# Patient Record
Sex: FEMALE | Race: BLACK OR AFRICAN AMERICAN | NOT HISPANIC OR LATINO | Employment: FULL TIME | URBAN - METROPOLITAN AREA
[De-identification: names, ages, dates, MRNs, and addresses within clinical notes are randomized per-mention and may not be internally consistent; named-entity substitution may affect disease eponyms.]

---

## 2017-03-27 ENCOUNTER — TELEPHONE (OUTPATIENT)
Dept: INTERNAL MEDICINE | Facility: CLINIC | Age: 27
End: 2017-03-27

## 2017-03-27 NOTE — TELEPHONE ENCOUNTER
----- Message from Sushma Flores sent at 3/27/2017  4:08 PM CDT -----  [phone]    Would like to consult with the nurse about patient's medication.  Pt medication is currently on back order and would like a different Rx.  Please call back @ [phone].  Thanks-[facility]

## 2017-06-03 DIAGNOSIS — E55.9 VITAMIN D DEFICIENCY: ICD-10-CM

## 2017-06-04 RX ORDER — ERGOCALCIFEROL 1.25 MG/1
50000 CAPSULE ORAL WEEKLY
Qty: 4 CAPSULE | Refills: 3 | OUTPATIENT
Start: 2017-06-04 | End: 2017-07-04

## 2017-11-24 ENCOUNTER — TELEPHONE (OUTPATIENT)
Dept: INTERNAL MEDICINE | Facility: CLINIC | Age: 27
End: 2017-11-24

## 2017-11-24 ENCOUNTER — LAB VISIT (OUTPATIENT)
Dept: LAB | Facility: HOSPITAL | Age: 27
End: 2017-11-24
Attending: INTERNAL MEDICINE
Payer: COMMERCIAL

## 2017-11-24 DIAGNOSIS — E55.9 VITAMIN D DEFICIENCY: ICD-10-CM

## 2017-11-24 DIAGNOSIS — R06.02 SOB (SHORTNESS OF BREATH): Primary | ICD-10-CM

## 2017-11-24 DIAGNOSIS — R06.02 SOB (SHORTNESS OF BREATH): ICD-10-CM

## 2017-11-24 LAB
25(OH)D3+25(OH)D2 SERPL-MCNC: 10 NG/ML
ALBUMIN SERPL BCP-MCNC: 3.6 G/DL
ALP SERPL-CCNC: 61 U/L
ALT SERPL W/O P-5'-P-CCNC: 9 U/L
ANION GAP SERPL CALC-SCNC: 3 MMOL/L
AST SERPL-CCNC: 15 U/L
BASOPHILS # BLD AUTO: 0.03 K/UL
BASOPHILS NFR BLD: 0.4 %
BILIRUB SERPL-MCNC: 0.3 MG/DL
BUN SERPL-MCNC: 11 MG/DL
CALCIUM SERPL-MCNC: 9 MG/DL
CHLORIDE SERPL-SCNC: 108 MMOL/L
CO2 SERPL-SCNC: 28 MMOL/L
CREAT SERPL-MCNC: 0.8 MG/DL
DIFFERENTIAL METHOD: ABNORMAL
EOSINOPHIL # BLD AUTO: 0.1 K/UL
EOSINOPHIL NFR BLD: 1.1 %
ERYTHROCYTE [DISTWIDTH] IN BLOOD BY AUTOMATED COUNT: 16.4 %
EST. GFR  (AFRICAN AMERICAN): >60 ML/MIN/1.73 M^2
EST. GFR  (NON AFRICAN AMERICAN): >60 ML/MIN/1.73 M^2
FERRITIN SERPL-MCNC: 7 NG/ML
GLUCOSE SERPL-MCNC: 77 MG/DL
HCT VFR BLD AUTO: 31.7 %
HGB BLD-MCNC: 9.8 G/DL
IMM GRANULOCYTES # BLD AUTO: 0.02 K/UL
IMM GRANULOCYTES NFR BLD AUTO: 0.2 %
IRON SERPL-MCNC: 20 UG/DL
LYMPHOCYTES # BLD AUTO: 1.9 K/UL
LYMPHOCYTES NFR BLD: 22.4 %
MCH RBC QN AUTO: 24.3 PG
MCHC RBC AUTO-ENTMCNC: 30.9 G/DL
MCV RBC AUTO: 79 FL
MONOCYTES # BLD AUTO: 0.6 K/UL
MONOCYTES NFR BLD: 7.4 %
NEUTROPHILS # BLD AUTO: 5.7 K/UL
NEUTROPHILS NFR BLD: 68.5 %
NRBC BLD-RTO: 0 /100 WBC
PLATELET # BLD AUTO: 444 K/UL
PMV BLD AUTO: 11.3 FL
POTASSIUM SERPL-SCNC: 3.6 MMOL/L
PROT SERPL-MCNC: 7.3 G/DL
RBC # BLD AUTO: 4.04 M/UL
SATURATED IRON: 5 %
SODIUM SERPL-SCNC: 139 MMOL/L
TOTAL IRON BINDING CAPACITY: 410 UG/DL
TRANSFERRIN SERPL-MCNC: 277 MG/DL
TSH SERPL DL<=0.005 MIU/L-ACNC: 0.89 UIU/ML
WBC # BLD AUTO: 8.25 K/UL

## 2017-11-24 PROCEDURE — 84443 ASSAY THYROID STIM HORMONE: CPT

## 2017-11-24 PROCEDURE — 85025 COMPLETE CBC W/AUTO DIFF WBC: CPT

## 2017-11-24 PROCEDURE — 82728 ASSAY OF FERRITIN: CPT

## 2017-11-24 PROCEDURE — 83540 ASSAY OF IRON: CPT

## 2017-11-24 PROCEDURE — 80053 COMPREHEN METABOLIC PANEL: CPT

## 2017-11-24 PROCEDURE — 82306 VITAMIN D 25 HYDROXY: CPT

## 2017-11-24 PROCEDURE — 36415 COLL VENOUS BLD VENIPUNCTURE: CPT | Mod: PO

## 2017-11-24 NOTE — TELEPHONE ENCOUNTER
Please sched lab now.  Pt reporting sob on exertion.  Not taking iron, continues with menorrhagia.  Not taking vit D.  SM

## 2017-11-26 ENCOUNTER — PATIENT MESSAGE (OUTPATIENT)
Dept: INTERNAL MEDICINE | Facility: CLINIC | Age: 27
End: 2017-11-26

## 2017-11-26 DIAGNOSIS — D50.0 IRON DEFICIENCY ANEMIA DUE TO CHRONIC BLOOD LOSS: Primary | ICD-10-CM

## 2017-11-26 RX ORDER — ERGOCALCIFEROL 1.25 MG/1
50000 CAPSULE ORAL WEEKLY
Qty: 4 CAPSULE | Refills: 4 | Status: SHIPPED | OUTPATIENT
Start: 2017-11-26 | End: 2017-12-26

## 2018-02-21 ENCOUNTER — LAB VISIT (OUTPATIENT)
Dept: LAB | Facility: HOSPITAL | Age: 28
End: 2018-02-21
Attending: INTERNAL MEDICINE
Payer: COMMERCIAL

## 2018-02-21 DIAGNOSIS — D50.0 IRON DEFICIENCY ANEMIA DUE TO CHRONIC BLOOD LOSS: ICD-10-CM

## 2018-02-21 LAB
25(OH)D3+25(OH)D2 SERPL-MCNC: 11 NG/ML
BASOPHILS # BLD AUTO: 0.02 K/UL
BASOPHILS NFR BLD: 0.3 %
DIFFERENTIAL METHOD: ABNORMAL
EOSINOPHIL # BLD AUTO: 0.1 K/UL
EOSINOPHIL NFR BLD: 0.8 %
ERYTHROCYTE [DISTWIDTH] IN BLOOD BY AUTOMATED COUNT: 17.2 %
FERRITIN SERPL-MCNC: 11 NG/ML
HCT VFR BLD AUTO: 34.3 %
HGB BLD-MCNC: 11 G/DL
IMM GRANULOCYTES # BLD AUTO: 0.02 K/UL
IMM GRANULOCYTES NFR BLD AUTO: 0.3 %
IRON SERPL-MCNC: 227 UG/DL
LYMPHOCYTES # BLD AUTO: 1.4 K/UL
LYMPHOCYTES NFR BLD: 21.8 %
MCH RBC QN AUTO: 26.6 PG
MCHC RBC AUTO-ENTMCNC: 32.1 G/DL
MCV RBC AUTO: 83 FL
MONOCYTES # BLD AUTO: 0.5 K/UL
MONOCYTES NFR BLD: 7.2 %
NEUTROPHILS # BLD AUTO: 4.6 K/UL
NEUTROPHILS NFR BLD: 69.6 %
NRBC BLD-RTO: 0 /100 WBC
PLATELET # BLD AUTO: 342 K/UL
PMV BLD AUTO: 11.1 FL
RBC # BLD AUTO: 4.14 M/UL
SATURATED IRON: 71 %
TOTAL IRON BINDING CAPACITY: 321 UG/DL
TRANSFERRIN SERPL-MCNC: 217 MG/DL
WBC # BLD AUTO: 6.56 K/UL

## 2018-02-21 PROCEDURE — 36415 COLL VENOUS BLD VENIPUNCTURE: CPT | Mod: PO

## 2018-02-21 PROCEDURE — 85025 COMPLETE CBC W/AUTO DIFF WBC: CPT

## 2018-02-21 PROCEDURE — 82306 VITAMIN D 25 HYDROXY: CPT

## 2018-02-21 PROCEDURE — 82728 ASSAY OF FERRITIN: CPT

## 2018-02-21 PROCEDURE — 83540 ASSAY OF IRON: CPT

## 2018-05-10 ENCOUNTER — PATIENT MESSAGE (OUTPATIENT)
Dept: INTERNAL MEDICINE | Facility: CLINIC | Age: 28
End: 2018-05-10

## 2018-06-04 ENCOUNTER — PATIENT MESSAGE (OUTPATIENT)
Dept: INTERNAL MEDICINE | Facility: CLINIC | Age: 28
End: 2018-06-04

## 2018-08-23 ENCOUNTER — OFFICE VISIT (OUTPATIENT)
Dept: DERMATOLOGY | Facility: CLINIC | Age: 28
End: 2018-08-23
Payer: COMMERCIAL

## 2018-08-23 ENCOUNTER — PATIENT MESSAGE (OUTPATIENT)
Dept: DERMATOLOGY | Facility: CLINIC | Age: 28
End: 2018-08-23

## 2018-08-23 DIAGNOSIS — L81.9 HYPERPIGMENTATION: ICD-10-CM

## 2018-08-23 DIAGNOSIS — L70.0 ACNE VULGARIS: Primary | ICD-10-CM

## 2018-08-23 DIAGNOSIS — L21.9 SEBORRHEIC DERMATITIS: ICD-10-CM

## 2018-08-23 PROCEDURE — 99202 OFFICE O/P NEW SF 15 MIN: CPT | Mod: S$GLB,,, | Performed by: PHYSICIAN ASSISTANT

## 2018-08-23 PROCEDURE — 99999 PR PBB SHADOW E&M-EST. PATIENT-LVL III: CPT | Mod: PBBFAC,,, | Performed by: PHYSICIAN ASSISTANT

## 2018-08-23 RX ORDER — ADAPALENE AND BENZOYL PEROXIDE 3; 25 MG/G; MG/G
GEL TOPICAL
Qty: 45 G | Refills: 3 | Status: SHIPPED | OUTPATIENT
Start: 2018-08-23 | End: 2020-02-24

## 2018-08-23 RX ORDER — CLINDAMYCIN PHOSPHATE 10 MG/ML
SOLUTION TOPICAL
Qty: 60 EACH | Refills: 11 | Status: SHIPPED | OUTPATIENT
Start: 2018-08-23 | End: 2020-02-24

## 2018-08-23 RX ORDER — KETOCONAZOLE 20 MG/G
CREAM TOPICAL
Qty: 30 G | Refills: 3 | Status: SHIPPED | OUTPATIENT
Start: 2018-08-23 | End: 2019-12-11 | Stop reason: SDUPTHER

## 2018-08-23 NOTE — PROGRESS NOTES
Subjective:       Patient ID:  Beth Hernandez is a 28 y.o. female who presents for   Chief Complaint   Patient presents with    Acne     c/o acne to face and hairline     History of Present Illness: The patient presents with chief complaint of acne. + Flares every 2-3 days. +Worse with menses; +Regular menses; +Pimples, pustules, and cysts. + Oily skin; Denies sensitive skin.   Location: face and hairline  Duration: several months  Signs/Symptoms: itchy, tender, burns, hyperpigmentation    Prior treatments: Neutrogena scrub and Neutrogena Acne (sal acid) moisturizer          Review of Systems   Constitutional: Negative for fever and chills.   Gastrointestinal: Negative for nausea and vomiting.   Skin: Positive for activity-related sunscreen use. Negative for itching, rash, daily sunscreen use and recent sunburn.   Hematologic/Lymphatic: Does not bruise/bleed easily.        Objective:    Physical Exam   Constitutional: She appears well-developed and well-nourished. No distress.   Neurological: She is alert and oriented to person, place, and time. She is not disoriented.   Psychiatric: She has a normal mood and affect.   Skin:   Areas Examined (abnormalities noted in diagram):   Scalp / Hair Palpated and Inspected  Head / Face Inspection Performed  Neck Inspection Performed  Chest / Axilla Inspection Performed  Back Inspection Performed  RUE Inspected  LUE Inspection Performed                   Diagram Legend     Erythematous scaling macule/papule c/w actinic keratosis       Vascular papule c/w angioma      Pigmented verrucoid papule/plaque c/w seborrheic keratosis      Yellow umbilicated papule c/w sebaceous hyperplasia      Irregularly shaped tan macule c/w lentigo     1-2 mm smooth white papules consistent with Milia      Movable subcutaneous cyst with punctum c/w epidermal inclusion cyst      Subcutaneous movable cyst c/w pilar cyst      Firm pink to brown papule c/w dermatofibroma      Pedunculated  fleshy papule(s) c/w skin tag(s)      Evenly pigmented macule c/w junctional nevus     Mildly variegated pigmented, slightly irregular-bordered macule c/w mildly atypical nevus      Flesh colored to evenly pigmented papule c/w intradermal nevus       Pink pearly papule/plaque c/w basal cell carcinoma      Erythematous hyperkeratotic cursted plaque c/w SCC      Surgical scar with no sign of skin cancer recurrence      Open and closed comedones      Inflammatory papules and pustules      Verrucoid papule consistent consistent with wart     Erythematous eczematous patches and plaques     Dystrophic onycholytic nail with subungual debris c/w onychomycosis     Umbilicated papule    Erythematous-base heme-crusted tan verrucoid plaque consistent with inflamed seborrheic keratosis     Erythematous Silvery Scaling Plaque c/w Psoriasis     See annotation      Assessment / Plan:        Acne vulgaris  Hyperpigmentation  -     EPIDUO FORTE 0.3-2.5 % GlwP; Apply a pea-sized amount to the entire face at bedtime.  Use every third night and increase as tolerated to nightly.  Dispense: 45 g; Refill: 3  -     clindamycin (CLEOCIN T) 1 % Swab; AAA of face 1-2 times daily for acne  Dispense: 60 each; Refill: 11  Start above meds with Cetaphil Oil Control Cleanser and daily sunscreen such as Cera Ve AM with invisible zinc. D/C Neutrogena acne scrub and moisturizer.    Seborrheic dermatitis  -     ketoconazole (NIZORAL) 2 % cream; AAA 2-3 times daily prn flaking.  Dispense: 30 g; Refill: 3  Start above med. Discussed waxing and waning of condition.         Follow-up in about 3 months (around 11/23/2018) for acne.

## 2018-08-23 NOTE — PATIENT INSTRUCTIONS
"Recommend a gentle skin care regimen.  Look for cosmetics and skin care products with the label, "non-comedogenic," which means it will not cause acne.     Use a mild gentle cleanser once to twice daily such as Cetaphil Oil-Control Foaming Cleanser, CeraVe Foaming Cleanser, or Basis soap.      Use a mild gentle moisturizer such as Cera Ve or Cetaphil.    A daily sunscreen with zinc oxide, broad-spectrum coverage, and a minimum SPF of 30 is recommended to help minimize the risk of scarring and discoloration from acne lesions.  Cera Ve AM with invisible zinc is a good sunscreen for acne prone skin.    It will take about 6-8 weeks to see about a 60-80% improvement in your acne.  It is very important to be consistent with your skin care regimen and to follow the treatment plan as discussed today.     "

## 2018-08-23 NOTE — PROGRESS NOTES
Subjective:       Patient ID:  Beth Hernandez is a 28 y.o. female who presents for No chief complaint on file.    HPI    Review of Systems     Objective:    Physical Exam       Diagram Legend     Erythematous scaling macule/papule c/w actinic keratosis       Vascular papule c/w angioma      Pigmented verrucoid papule/plaque c/w seborrheic keratosis      Yellow umbilicated papule c/w sebaceous hyperplasia      Irregularly shaped tan macule c/w lentigo     1-2 mm smooth white papules consistent with Milia      Movable subcutaneous cyst with punctum c/w epidermal inclusion cyst      Subcutaneous movable cyst c/w pilar cyst      Firm pink to brown papule c/w dermatofibroma      Pedunculated fleshy papule(s) c/w skin tag(s)      Evenly pigmented macule c/w junctional nevus     Mildly variegated pigmented, slightly irregular-bordered macule c/w mildly atypical nevus      Flesh colored to evenly pigmented papule c/w intradermal nevus       Pink pearly papule/plaque c/w basal cell carcinoma      Erythematous hyperkeratotic cursted plaque c/w SCC      Surgical scar with no sign of skin cancer recurrence      Open and closed comedones      Inflammatory papules and pustules      Verrucoid papule consistent consistent with wart     Erythematous eczematous patches and plaques     Dystrophic onycholytic nail with subungual debris c/w onychomycosis     Umbilicated papule    Erythematous-base heme-crusted tan verrucoid plaque consistent with inflamed seborrheic keratosis     Erythematous Silvery Scaling Plaque c/w Psoriasis     See annotation      Assessment / Plan:        There are no diagnoses linked to this encounter.         No Follow-up on file.

## 2019-01-30 ENCOUNTER — OFFICE VISIT (OUTPATIENT)
Dept: GASTROENTEROLOGY | Facility: CLINIC | Age: 29
End: 2019-01-30
Payer: COMMERCIAL

## 2019-01-30 VITALS
WEIGHT: 180.13 LBS | HEIGHT: 66 IN | DIASTOLIC BLOOD PRESSURE: 66 MMHG | BODY MASS INDEX: 28.95 KG/M2 | SYSTOLIC BLOOD PRESSURE: 108 MMHG | HEART RATE: 68 BPM

## 2019-01-30 DIAGNOSIS — K21.9 GASTROESOPHAGEAL REFLUX DISEASE, ESOPHAGITIS PRESENCE NOT SPECIFIED: Primary | ICD-10-CM

## 2019-01-30 PROCEDURE — 3008F PR BODY MASS INDEX (BMI) DOCUMENTED: ICD-10-PCS | Mod: CPTII,S$GLB,, | Performed by: PHYSICIAN ASSISTANT

## 2019-01-30 PROCEDURE — 99999 PR PBB SHADOW E&M-EST. PATIENT-LVL IV: ICD-10-PCS | Mod: PBBFAC,,, | Performed by: PHYSICIAN ASSISTANT

## 2019-01-30 PROCEDURE — 3008F BODY MASS INDEX DOCD: CPT | Mod: CPTII,S$GLB,, | Performed by: PHYSICIAN ASSISTANT

## 2019-01-30 PROCEDURE — 99203 PR OFFICE/OUTPT VISIT, NEW, LEVL III, 30-44 MIN: ICD-10-PCS | Mod: S$GLB,,, | Performed by: PHYSICIAN ASSISTANT

## 2019-01-30 PROCEDURE — 99999 PR PBB SHADOW E&M-EST. PATIENT-LVL IV: CPT | Mod: PBBFAC,,, | Performed by: PHYSICIAN ASSISTANT

## 2019-01-30 PROCEDURE — 99203 OFFICE O/P NEW LOW 30 MIN: CPT | Mod: S$GLB,,, | Performed by: PHYSICIAN ASSISTANT

## 2019-01-30 RX ORDER — PANTOPRAZOLE SODIUM 40 MG/1
40 TABLET, DELAYED RELEASE ORAL DAILY
Qty: 30 TABLET | Refills: 2 | Status: SHIPPED | OUTPATIENT
Start: 2019-01-30 | End: 2020-02-24 | Stop reason: ALTCHOICE

## 2019-01-30 RX ORDER — DOCUSATE SODIUM 100 MG/1
100 CAPSULE, LIQUID FILLED ORAL DAILY
COMMUNITY
End: 2022-10-28

## 2019-01-30 NOTE — PROGRESS NOTES
GI OUTPATIENT NOTE    PCP: Carol Calvo No address on file    Chief Complaint   Patient presents with    new Patient    Anemia    Gastroesophageal Reflux    Heartburn    Constipation       HISTORY OF PRESENT ILLNESS:  28 y.o. female presents to the GI clinic today for initial evaluation. The patient complains of heartburn. This has been going on several months. It occurs at least five times a week and occasionally wakes her at night. She has tried OTC Prilosec which helps, but says she can't miss a day without sx. She has occasional nausea after eating but no vomiting. She denies a change in diet, weight loss, hematochezia, melena, hematemesis or dysphagia. She reports gaining 10 pounds in the last few months. She denies regular NSAID use. She has a long history of iron def anemia. She is on an iron supplement which causes some constipation and dark stool. She had an EGD and Colonoscopy in 2005 for this.    Coffee 1-2 a day  Soda - not much   Eats about 8-9 pm and in bed around 10 pm.     Past Medical History:   Diagnosis Date    Allergy     GERD (gastroesophageal reflux disease)     Iron deficiency anemia        Past Surgical History:   Procedure Laterality Date    COLONOSCOPY N/A 9/17/2015    [facility]    ESOPHAGOGASTRODUODENOSCOPY (EGD) N/A 9/17/2015    [facility]    WISDOM TOOTH EXTRACTION         Social History     Socioeconomic History    Marital status: Single     Spouse name: Not on file    Number of children: Not on file    Years of education: Not on file    Highest education level: Not on file   Social Needs    Financial resource strain: Not on file    Food insecurity - worry: Not on file    Food insecurity - inability: Not on file    Transportation needs - medical: Not on file    Transportation needs - non-medical: Not on file   Occupational History    Not on file   Tobacco Use    Smoking status: Never  Smoker    Smokeless tobacco: Never Used   Substance and Sexual Activity    Alcohol use: Yes     Alcohol/week: 0.0 oz     Comment: Socially    Drug use: No    Sexual activity: Not on file   Other Topics Concern    Are you pregnant or think you may be? Not Asked    Breast-feeding Not Asked   Social History Narrative    Not on file       Family History   Problem Relation Age of Onset    Cancer Neg Hx        MEDS/ALLERGY:  The patient's medications and allergies were reviewed and updated in the EPIC chart.     Review of Systems   Constitutional: Negative for fever.   HENT: Negative for hearing loss.    Eyes: Negative for visual disturbance.   Respiratory: Positive for shortness of breath (None currently ). Negative for cough.    Cardiovascular: Negative for chest pain.   Gastrointestinal:        As per HPI.   Genitourinary: Negative for dysuria, frequency and hematuria.   Musculoskeletal: Positive for back pain. Negative for arthralgias.   Skin: Negative for rash.   Neurological: Negative for seizures, syncope, numbness and headaches.   Hematological: Does not bruise/bleed easily.   Psychiatric/Behavioral: The patient is not nervous/anxious.        Physical Exam   Constitutional: She is oriented to person, place, and time. Vital signs are normal. She appears well-developed and well-nourished.   HENT:   Head: Normocephalic and atraumatic.   Eyes: EOM are normal.   Neck: Normal range of motion. Neck supple. Carotid bruit is not present. No thyromegaly present.   Cardiovascular: Normal rate and regular rhythm.   No murmur heard.  Pulmonary/Chest: Effort normal and breath sounds normal. No respiratory distress. She has no wheezes.   Abdominal: Soft. Normal appearance and bowel sounds are normal. She exhibits no distension and no mass. There is no tenderness.   Musculoskeletal: She exhibits no edema.   Neurological: She is alert and oriented to person, place, and time. No cranial nerve deficit. Gait normal.   Skin:  Skin is warm and dry. No rash noted.   Psychiatric: Thought content normal.       LABS/IMAGING:   Pertinent results were reviewed.     ASSESSMENT:  1. Gastroesophageal reflux disease, esophagitis presence not specified        PLAN:  I will start her on PPI daily. We discussed reflux precautions and additional information is included in her AVS. She is worried about damage so I will start with an UGI series. She will let me know through MyChart how she is doing.     Medications Ordered This Encounter   Medications    pantoprazole (PROTONIX) 40 MG tablet     Sig: Take 1 tablet (40 mg total) by mouth once daily.     Dispense:  30 tablet     Refill:  2     ORDER SUMMARY:  Orders Placed This Encounter   Procedures    FL Upper GI With KUB        Follow-up if symptoms worsen or fail to improve.     Thank you for the opportunity to participate in the care of this patient. This consult was designated to me by my supervising physician. He fully participated in the development of the assessment and recommendations.    Christopher Enamorado PA-C.

## 2019-01-30 NOTE — PATIENT INSTRUCTIONS
Tips to Control Acid Reflux    To control acid reflux, youll need to make some basic diet and lifestyle changes. The simple steps outlined below may be all youll need to ease discomfort.  Watch what you eat  · Avoid fatty foods and spicy foods.  · Eat fewer acidic foods, such as citrus and tomato-based foods. These can increase symptoms.  · Limit drinking alcohol, caffeine, and fizzy beverages. All increase acid reflux.  · Try limiting chocolate, peppermint, and spearmint. These can worsen acid reflux in some people.  Watch when you eat  · Avoid lying down for 3 hours after eating.  · Do not snack before going to bed.  Raise your head  Raising your head and upper body by 4 to 6 inches helps limit reflux when youre lying down. Put blocks under the head of your bed frame to raise it.  Other changes  · Lose weight, if you need to  · Dont exercise near bedtime  · Avoid tight-fitting clothes  · Limit aspirin and ibuprofen  · Stop smoking   Date Last Reviewed: 7/1/2016  © 4833-8650 [employer]. [address] [place] [address], [place] [address]. All rights reserved. This information is not intended as a substitute for professional medical care. Always follow your healthcare professional's instructions.

## 2019-12-11 RX ORDER — KETOCONAZOLE 20 MG/G
CREAM TOPICAL
Qty: 30 G | Refills: 3 | Status: SHIPPED | OUTPATIENT
Start: 2019-12-11 | End: 2020-02-24

## 2020-02-12 ENCOUNTER — HOSPITAL ENCOUNTER (EMERGENCY)
Facility: HOSPITAL | Age: 30
Discharge: HOME | End: 2020-02-13
Attending: FAMILY MEDICINE
Payer: COMMERCIAL

## 2020-02-12 VITALS
HEART RATE: 93 BPM | DIASTOLIC BLOOD PRESSURE: 72 MMHG | RESPIRATION RATE: 18 BRPM | BODY MASS INDEX: 27.27 KG/M2 | WEIGHT: 173.75 LBS | HEIGHT: 67 IN | OXYGEN SATURATION: 99 % | TEMPERATURE: 99 F | SYSTOLIC BLOOD PRESSURE: 125 MMHG

## 2020-02-12 DIAGNOSIS — R06.02 SHORTNESS OF BREATH: ICD-10-CM

## 2020-02-12 DIAGNOSIS — L50.9 URTICARIA: Primary | ICD-10-CM

## 2020-02-12 LAB
ALBUMIN SERPL BCP-MCNC: 3.7 G/DL (ref 3.5–5.2)
ALP SERPL-CCNC: 67 U/L (ref 55–135)
ALT SERPL W/O P-5'-P-CCNC: 8 U/L (ref 10–44)
ANION GAP SERPL CALC-SCNC: 10 MMOL/L (ref 8–16)
AST SERPL-CCNC: 12 U/L (ref 10–40)
B-HCG UR QL: NEGATIVE
BASOPHILS # BLD AUTO: 0.02 K/UL (ref 0–0.2)
BASOPHILS NFR BLD: 0.2 % (ref 0–1.9)
BILIRUB SERPL-MCNC: 0.1 MG/DL (ref 0.1–1)
BILIRUB UR QL STRIP: NEGATIVE
BNP SERPL-MCNC: 16 PG/ML (ref 0–99)
BUN SERPL-MCNC: 16 MG/DL (ref 6–20)
CALCIUM SERPL-MCNC: 8.7 MG/DL (ref 8.7–10.5)
CHLORIDE SERPL-SCNC: 108 MMOL/L (ref 95–110)
CLARITY UR: CLEAR
CO2 SERPL-SCNC: 22 MMOL/L (ref 23–29)
COLOR UR: YELLOW
CREAT SERPL-MCNC: 0.8 MG/DL (ref 0.5–1.4)
DIFFERENTIAL METHOD: ABNORMAL
EOSINOPHIL # BLD AUTO: 0.2 K/UL (ref 0–0.5)
EOSINOPHIL NFR BLD: 1.2 % (ref 0–8)
ERYTHROCYTE [DISTWIDTH] IN BLOOD BY AUTOMATED COUNT: 17.4 % (ref 11.5–14.5)
EST. GFR  (AFRICAN AMERICAN): >60 ML/MIN/1.73 M^2
EST. GFR  (NON AFRICAN AMERICAN): >60 ML/MIN/1.73 M^2
GLUCOSE SERPL-MCNC: 110 MG/DL (ref 70–110)
GLUCOSE UR QL STRIP: NEGATIVE
HCT VFR BLD AUTO: 30.9 % (ref 37–48.5)
HGB BLD-MCNC: 9.1 G/DL (ref 12–16)
HGB UR QL STRIP: NEGATIVE
IMM GRANULOCYTES # BLD AUTO: 0.07 K/UL (ref 0–0.04)
IMM GRANULOCYTES NFR BLD AUTO: 0.5 % (ref 0–0.5)
KETONES UR QL STRIP: NEGATIVE
LEUKOCYTE ESTERASE UR QL STRIP: NEGATIVE
LYMPHOCYTES # BLD AUTO: 2.9 K/UL (ref 1–4.8)
LYMPHOCYTES NFR BLD: 22.6 % (ref 18–48)
MCH RBC QN AUTO: 21.9 PG (ref 27–31)
MCHC RBC AUTO-ENTMCNC: 29.4 G/DL (ref 32–36)
MCV RBC AUTO: 75 FL (ref 82–98)
MONOCYTES # BLD AUTO: 0.9 K/UL (ref 0.3–1)
MONOCYTES NFR BLD: 6.8 % (ref 4–15)
NEUTROPHILS # BLD AUTO: 8.9 K/UL (ref 1.8–7.7)
NEUTROPHILS NFR BLD: 68.7 % (ref 38–73)
NITRITE UR QL STRIP: NEGATIVE
NRBC BLD-RTO: 0 /100 WBC
PH UR STRIP: 6 [PH] (ref 5–8)
PLATELET # BLD AUTO: 431 K/UL (ref 150–350)
PMV BLD AUTO: 10.3 FL (ref 9.2–12.9)
POTASSIUM SERPL-SCNC: 3.9 MMOL/L (ref 3.5–5.1)
PROT SERPL-MCNC: 6.9 G/DL (ref 6–8.4)
PROT UR QL STRIP: NEGATIVE
RBC # BLD AUTO: 4.15 M/UL (ref 4–5.4)
SODIUM SERPL-SCNC: 140 MMOL/L (ref 136–145)
SP GR UR STRIP: >=1.03 (ref 1–1.03)
TROPONIN I SERPL DL<=0.01 NG/ML-MCNC: <0.006 NG/ML (ref 0–0.03)
URN SPEC COLLECT METH UR: ABNORMAL
UROBILINOGEN UR STRIP-ACNC: 1 EU/DL
WBC # BLD AUTO: 12.9 K/UL (ref 3.9–12.7)

## 2020-02-12 PROCEDURE — 81003 URINALYSIS AUTO W/O SCOPE: CPT

## 2020-02-12 PROCEDURE — 81025 URINE PREGNANCY TEST: CPT

## 2020-02-12 PROCEDURE — 80053 COMPREHEN METABOLIC PANEL: CPT

## 2020-02-12 PROCEDURE — 85025 COMPLETE CBC W/AUTO DIFF WBC: CPT

## 2020-02-12 PROCEDURE — 25000003 PHARM REV CODE 250: Performed by: FAMILY MEDICINE

## 2020-02-12 PROCEDURE — 96374 THER/PROPH/DIAG INJ IV PUSH: CPT

## 2020-02-12 PROCEDURE — 93005 ELECTROCARDIOGRAM TRACING: CPT

## 2020-02-12 PROCEDURE — 99285 EMERGENCY DEPT VISIT HI MDM: CPT | Mod: 25

## 2020-02-12 PROCEDURE — 93010 ELECTROCARDIOGRAM REPORT: CPT | Mod: ,,, | Performed by: INTERNAL MEDICINE

## 2020-02-12 PROCEDURE — 36415 COLL VENOUS BLD VENIPUNCTURE: CPT

## 2020-02-12 PROCEDURE — 63600175 PHARM REV CODE 636 W HCPCS: Performed by: FAMILY MEDICINE

## 2020-02-12 PROCEDURE — 83880 ASSAY OF NATRIURETIC PEPTIDE: CPT

## 2020-02-12 PROCEDURE — 93010 EKG 12-LEAD: ICD-10-PCS | Mod: ,,, | Performed by: INTERNAL MEDICINE

## 2020-02-12 PROCEDURE — 84484 ASSAY OF TROPONIN QUANT: CPT

## 2020-02-12 RX ORDER — METHYLPREDNISOLONE 4 MG/1
TABLET ORAL
Qty: 1 PACKAGE | Refills: 0 | Status: SHIPPED | OUTPATIENT
Start: 2020-02-12 | End: 2020-02-24 | Stop reason: ALTCHOICE

## 2020-02-12 RX ORDER — METHYLPREDNISOLONE SOD SUCC 125 MG
125 VIAL (EA) INJECTION
Status: COMPLETED | OUTPATIENT
Start: 2020-02-12 | End: 2020-02-12

## 2020-02-12 RX ORDER — DIPHENHYDRAMINE HCL 50 MG
50 CAPSULE ORAL
Status: COMPLETED | OUTPATIENT
Start: 2020-02-12 | End: 2020-02-12

## 2020-02-12 RX ADMIN — METHYLPREDNISOLONE SODIUM SUCCINATE 125 MG: 125 INJECTION, POWDER, FOR SOLUTION INTRAMUSCULAR; INTRAVENOUS at 11:02

## 2020-02-12 RX ADMIN — DIPHENHYDRAMINE HYDROCHLORIDE 50 MG: 50 CAPSULE ORAL at 11:02

## 2020-02-13 NOTE — ED PROVIDER NOTES
2/12/2020, 8:59 PM   History obtained from the patient      History of Present Illness: Beth Hernandez is a 30 y.o. female patient presenting to the er for evaluation of hives, lip swelling, throat tightness and shortness of breath. Pt reports she is currently on amoxicillin, steroids, and an antihistime.    9:01 PM: Pt was placed back in [facility]. I explained to pt that due to lack of available rooms pt was seen by myself to begin the workup. Pt understands they will be seen and dispositioned by the next available physician. Pt voices understanding and agrees with plan. Pt also understands the longer than normal wait time. I am removing myself from the care of pt and pt will now be assigned to the next available physician.         SCRIBE #1 NOTE: I, Dwayne Linton, am scribing for, and in the presence of, Sri Vivas MD. I have scribed the entire note.       History     Chief Complaint   Patient presents with    Urticaria     Breaking out last few weeks, lips swelling and feeling throat closing.     Review of patient's allergies indicates:  No Known Allergies      History of Present Illness     HPI    2/12/2020, 10:04 PM  History obtained from the patient      History of Present Illness: Beth Hernandez is a 30 y.o. female patient with a PMHx of GERD and iron deficiency anemia who presents to the Emergency Department for evaluation of urticaria which onset gradually 3 weeks PTA. Symptoms are constant and moderate in severity. Pt had been Rx'd amoxacillin for an ear infection 8 days ago. No mitigating or exacerbating factors reported. Associated sxs include SOB, trouble swallowing, and CP with deep breaths. Patient denies any drooling, N/V, speech difficulty, voice change, wheezing, and all other sxs at this time. Prior Tx includes benadryl with minor sx improvement. No further complaints or concerns at this time.       Arrival mode: Personal vehicle    PCP: Carol Calvo MD        Past  Medical History:  Past Medical History:   Diagnosis Date    Allergy     GERD (gastroesophageal reflux disease)     Iron deficiency anemia        Past Surgical History:  Past Surgical History:   Procedure Laterality Date    COLONOSCOPY N/A 9/17/2015    Procedure: COLONOSCOPY;  Surgeon: Fred Baez MD;  Location: [facility];  Service: Endoscopy;  Laterality: N/A;    ESOPHAGOGASTRODUODENOSCOPY  2015    WISDOM TOOTH EXTRACTION           Family History:  Family History   Problem Relation Age of Onset    Cancer Neg Hx        Social History:  Social History     Tobacco Use    Smoking status: Never Smoker    Smokeless tobacco: Never Used   Substance and Sexual Activity    Alcohol use: Yes     Alcohol/week: 0.0 standard drinks     Comment: Socially    Drug use: No    Sexual activity: Unknown        Review of Systems     Review of Systems   Constitutional: Negative for chills and fever.   HENT: Positive for trouble swallowing. Negative for drooling, sore throat and voice change.    Respiratory: Positive for shortness of breath. Negative for cough and wheezing.    Cardiovascular: Positive for chest pain (With deep breaths). Negative for leg swelling.   Gastrointestinal: Negative for abdominal pain, diarrhea, nausea and vomiting.   Genitourinary: Negative for dysuria.   Musculoskeletal: Negative for back pain, neck pain and neck stiffness.   Skin: Positive for rash (Urticaria; BUE and BLE). Negative for wound.   Neurological: Negative for dizziness, speech difficulty, weakness, light-headedness, numbness and headaches.   Hematological: Does not bruise/bleed easily.   All other systems reviewed and are negative.     Physical Exam     Initial Vitals [02/12/20 2013]   BP Pulse Resp Temp SpO2   125/72 93 18 98.9 °F (37.2 °C) 99 %      MAP       --          Physical Exam  Nursing Notes and Vital Signs Reviewed.  Constitutional: Patient is in no acute distress. Well-developed and well-nourished.  Head: Atraumatic.  "Normocephalic.  Eyes: PERRL. EOM intact. Conjunctivae are not pale. No scleral icterus.  ENT: Mucous membranes are moist. Oropharynx is clear and patent with no edema.    Neck: Supple. Full ROM. No lymphadenopathy.  Cardiovascular: Regular rate. Regular rhythm. No murmurs, rubs, or gallops. Distal pulses are 2+ and symmetric.  Pulmonary/Chest: No respiratory distress. Clear to auscultation bilaterally. No wheezing or rales.  Abdominal: Soft and non-distended.  There is no tenderness.  No rebound, guarding, or rigidity. Good bowel sounds.  Genitourinary: No CVA tenderness  Musculoskeletal: Moves all extremities. No obvious deformities. No edema. No calf tenderness.  Skin: Warm and dry. Mild urticaria to the BUE.  Neurological:  Alert, awake, and appropriate.  Normal speech.  No acute focal neurological deficits are appreciated.  Psychiatric: Normal affect. Good eye contact. Appropriate in content.     ED Course   Procedures  ED Vital Signs:  Vitals:    02/12/20 2013   BP: 125/72   Pulse: 93   Resp: 18   Temp: 98.9 °F (37.2 °C)   TempSrc: Oral   SpO2: 99%   Weight: 78.8 kg (173 lb 11.6 oz)   Height: 5' 7" (1.702 m)       Abnormal Lab Results:  Labs Reviewed   CBC W/ AUTO DIFFERENTIAL - Abnormal; Notable for the following components:       Result Value    WBC 12.90 (*)     Hemoglobin 9.1 (*)     Hematocrit 30.9 (*)     Mean Corpuscular Volume 75 (*)     Mean Corpuscular Hemoglobin 21.9 (*)     Mean Corpuscular Hemoglobin Conc 29.4 (*)     RDW 17.4 (*)     Platelets 431 (*)     Gran # (ANC) 8.9 (*)     Immature Grans (Abs) 0.07 (*)     All other components within normal limits   COMPREHENSIVE METABOLIC PANEL - Abnormal; Notable for the following components:    CO2 22 (*)     ALT 8 (*)     All other components within normal limits   URINALYSIS, REFLEX TO URINE CULTURE - Abnormal; Notable for the following components:    Specific Gravity, UA >=1.030 (*)     All other components within normal limits    Narrative:     " Preferred Collection Type->Urine, Clean Catch   TROPONIN I   B-TYPE NATRIURETIC PEPTIDE   PREGNANCY TEST, URINE RAPID        All Lab Results:  Results for orders placed or performed during the hospital encounter of 02/12/20   CBC auto differential   Result Value Ref Range    WBC 12.90 (H) 3.90 - 12.70 K/uL    RBC 4.15 4.00 - 5.40 M/uL    Hemoglobin 9.1 (L) 12.0 - 16.0 g/dL    Hematocrit 30.9 (L) 37.0 - 48.5 %    Mean Corpuscular Volume 75 (L) 82 - 98 fL    Mean Corpuscular Hemoglobin 21.9 (L) 27.0 - 31.0 pg    Mean Corpuscular Hemoglobin Conc 29.4 (L) 32.0 - 36.0 g/dL    RDW 17.4 (H) 11.5 - 14.5 %    Platelets 431 (H) 150 - 350 K/uL    MPV 10.3 9.2 - 12.9 fL    Immature Granulocytes 0.5 0.0 - 0.5 %    Gran # (ANC) 8.9 (H) 1.8 - 7.7 K/uL    Immature Grans (Abs) 0.07 (H) 0.00 - 0.04 K/uL    Lymph # 2.9 1.0 - 4.8 K/uL    Mono # 0.9 0.3 - 1.0 K/uL    Eos # 0.2 0.0 - 0.5 K/uL    Baso # 0.02 0.00 - 0.20 K/uL    nRBC 0 0 /100 WBC    Gran% 68.7 38.0 - 73.0 %    Lymph% 22.6 18.0 - 48.0 %    Mono% 6.8 4.0 - 15.0 %    Eosinophil% 1.2 0.0 - 8.0 %    Basophil% 0.2 0.0 - 1.9 %    Differential Method Automated    Comprehensive metabolic panel   Result Value Ref Range    Sodium 140 136 - 145 mmol/L    Potassium 3.9 3.5 - 5.1 mmol/L    Chloride 108 95 - 110 mmol/L    CO2 22 (L) 23 - 29 mmol/L    Glucose 110 70 - 110 mg/dL    BUN, Bld 16 6 - 20 mg/dL    Creatinine 0.8 0.5 - 1.4 mg/dL    Calcium 8.7 8.7 - 10.5 mg/dL    Total Protein 6.9 6.0 - 8.4 g/dL    Albumin 3.7 3.5 - 5.2 g/dL    Total Bilirubin 0.1 0.1 - 1.0 mg/dL    Alkaline Phosphatase 67 55 - 135 U/L    AST 12 10 - 40 U/L    ALT 8 (L) 10 - 44 U/L    Anion Gap 10 8 - 16 mmol/L    eGFR if African American >60 >60 mL/min/1.73 m^2    eGFR if non African American >60 >60 mL/min/1.73 m^2   Troponin I   Result Value Ref Range    Troponin I <0.006 0.000 - 0.026 ng/mL   Brain natriuretic peptide   Result Value Ref Range    BNP 16 0 - 99 pg/mL   Urinalysis, Reflex to Urine Culture  Urine, Clean Catch   Result Value Ref Range    Specimen UA Urine, Clean Catch     Color, UA Yellow Yellow, Straw, Germaine    Appearance, UA Clear Clear    pH, UA 6.0 5.0 - 8.0    Specific Gravity, UA >=1.030 (A) 1.005 - 1.030    Protein, UA Negative Negative    Glucose, UA Negative Negative    Ketones, UA Negative Negative    Bilirubin (UA) Negative Negative    Occult Blood UA Negative Negative    Nitrite, UA Negative Negative    Urobilinogen, UA 1.0 <2.0 EU/dL    Leukocytes, UA Negative Negative   Pregnancy, urine rapid (UPT)   Result Value Ref Range    Preg Test, Ur Negative        Imaging Results:  Imaging Results          X-Ray Chest PA And Lateral (Final result)  Result time 02/12/20 23:15:15    Final result by Akash Garcia Jr., MD (02/12/20 23:15:15)                 Impression:      1. No acute chest findings.      Electronically signed by: Akash Garcia Jr., MD  Date:    02/12/2020  Time:    23:15             Narrative:    EXAMINATION:  XR CHEST PA AND LATERAL    CLINICAL HISTORY:  Hives with throat swelling.    COMPARISON:  04/18/2016    FINDINGS:  Expiratory study accentuates lung markings.  Left basilar atelectasis.  No consolidation.  The cardiac silhouette and mediastinum are within normal limits.  The bones and remaining soft tissues are within normal limits.  No effusion or pneumothorax.                                 The EKG was ordered, reviewed, and independently interpreted by the ED provider.  Interpretation time: 2121  Rate: 71 BPM  Rhythm: normal sinus rhythm  Interpretation: No acute ST changes. No STEMI.           The Emergency Provider reviewed the vital signs and test results, which are outlined above.     ED Discussion     12:02 AM: Reassessed pt at this time. Discussed with pt all pertinent ED information and results. Discussed pt dx and plan of tx. Gave pt all f/u and return to the ED instructions. All questions and concerns were addressed at this time. Pt expresses understanding of  information and instructions, and is comfortable with plan to discharge. Pt is stable for discharge.    I discussed with patient and/or family/caretaker that evaluation in the ED does not suggest any emergent or life threatening medical conditions requiring immediate intervention beyond what was provided in the ED, and I believe patient is safe for discharge.  Regardless, an unremarkable evaluation in the ED does not preclude the development or presence of a serious of life threatening condition. As such, patient was instructed to return immediately for any worsening or change in current symptoms.       Medical Decision Making:   Clinical Tests:   Lab Tests: Ordered and Reviewed  Radiological Study: Ordered and Reviewed  Medical Tests: Ordered and Reviewed           ED Medication(s):  Medications   methylPREDNISolone sodium succinate injection 125 mg (125 mg Intravenous Given 2/12/20 2318)   diphenhydrAMINE capsule 50 mg (50 mg Oral Given 2/12/20 2318)       Discharge Medication List as of 2/12/2020 10:30 PM      START taking these medications    Details   methylPREDNISolone (MEDROL DOSEPACK) 4 mg tablet Use u.d., Print                     Scribe Attestation:   Scribe #1: I performed the above scribed service and the documentation accurately describes the services I performed. I attest to the accuracy of the note.     Attending:   Physician Attestation Statement for Scribe #1: I, Sri Vivas MD, personally performed the services described in this documentation, as scribed by Dwayne Linton, in my presence, and it is both accurate and complete.           Clinical Impression       ICD-10-CM ICD-9-CM   1. Urticaria L50.9 708.9   2. Shortness of breath R06.02 786.05       Disposition:   Disposition: Discharged  Condition: Stable             Sri Vivas MD  02/13/20 [id number]

## 2020-02-19 ENCOUNTER — TELEPHONE (OUTPATIENT)
Dept: FAMILY MEDICINE | Facility: CLINIC | Age: 30
End: 2020-02-19

## 2020-02-19 NOTE — TELEPHONE ENCOUNTER
----- Message from Nora Brizuela LPN sent at 2020  3:56 PM CST -----  Please Advise,     Per [employer]     Beth David (: 1990), a patient of Dr. Sellers, was identified on our ADT file for ER visit on 20. She was experiencing symptoms of hives, lip swelling, throat tightness and SOB. I attempted to outreach the member but was unsuccessful. Can you make Dr. Sellers's nurse aware and see if they would like to reach out? I notice she does not have a follow up appointment scheduled in [facility].

## 2020-02-24 ENCOUNTER — OFFICE VISIT (OUTPATIENT)
Dept: GASTROENTEROLOGY | Facility: CLINIC | Age: 30
End: 2020-02-24
Payer: COMMERCIAL

## 2020-02-24 VITALS
BODY MASS INDEX: 27.34 KG/M2 | SYSTOLIC BLOOD PRESSURE: 108 MMHG | HEART RATE: 88 BPM | HEIGHT: 67 IN | DIASTOLIC BLOOD PRESSURE: 70 MMHG | WEIGHT: 174.19 LBS | OXYGEN SATURATION: 99 %

## 2020-02-24 DIAGNOSIS — K21.9 GASTROESOPHAGEAL REFLUX DISEASE, ESOPHAGITIS PRESENCE NOT SPECIFIED: Primary | ICD-10-CM

## 2020-02-24 PROCEDURE — 3008F PR BODY MASS INDEX (BMI) DOCUMENTED: ICD-10-PCS | Mod: CPTII,S$GLB,, | Performed by: PHYSICIAN ASSISTANT

## 2020-02-24 PROCEDURE — 99999 PR PBB SHADOW E&M-EST. PATIENT-LVL IV: ICD-10-PCS | Mod: PBBFAC,,, | Performed by: PHYSICIAN ASSISTANT

## 2020-02-24 PROCEDURE — 99213 OFFICE O/P EST LOW 20 MIN: CPT | Mod: S$GLB,,, | Performed by: PHYSICIAN ASSISTANT

## 2020-02-24 PROCEDURE — 99999 PR PBB SHADOW E&M-EST. PATIENT-LVL IV: CPT | Mod: PBBFAC,,, | Performed by: PHYSICIAN ASSISTANT

## 2020-02-24 PROCEDURE — 3008F BODY MASS INDEX DOCD: CPT | Mod: CPTII,S$GLB,, | Performed by: PHYSICIAN ASSISTANT

## 2020-02-24 PROCEDURE — 99213 PR OFFICE/OUTPT VISIT, EST, LEVL III, 20-29 MIN: ICD-10-PCS | Mod: S$GLB,,, | Performed by: PHYSICIAN ASSISTANT

## 2020-02-24 RX ORDER — OMEPRAZOLE 40 MG/1
40 CAPSULE, DELAYED RELEASE ORAL
Qty: 180 CAPSULE | Refills: 0 | Status: SHIPPED | OUTPATIENT
Start: 2020-02-24 | End: 2022-10-28

## 2020-02-24 NOTE — PATIENT INSTRUCTIONS
Tips to Control Acid Reflux    To control acid reflux, youll need to make some basic diet and lifestyle changes. The simple steps outlined below may be all youll need to ease discomfort.  Watch what you eat  · Avoid fatty foods and spicy foods.  · Eat fewer acidic foods, such as citrus and tomato-based foods. These can increase symptoms.  · Limit drinking alcohol, caffeine, and fizzy beverages. All increase acid reflux.  · Try limiting chocolate, peppermint, and spearmint. These can worsen acid reflux in some people.  Watch when you eat  · Avoid lying down for 3 hours after eating.  · Do not snack before going to bed.  Raise your head  Raising your head and upper body by 4 to 6 inches helps limit reflux when youre lying down. Put blocks under the head of your bed frame to raise it.  Other changes  · Lose weight, if you need to  · Dont exercise near bedtime  · Avoid tight-fitting clothes  · Limit aspirin and ibuprofen  · Stop smoking   Date Last Reviewed: 7/1/2016  © 9937-1551 [employer]. [address] [place] [address], [place] [address]. All rights reserved. This information is not intended as a substitute for professional medical care. Always follow your healthcare professional's instructions.

## 2020-02-24 NOTE — PROGRESS NOTES
Subjective:      Patient ID: Beth Hernandez is a 30 y.o. female.    Chief Complaint: Gastroesophageal Reflux    HPI   The patient has a history of reflux. She was last seen in January 2019. Recommendations at that time included a daily PPI and UGI series. The patient didn't do the imaging and never followed up. Today she complains of heartburn. It occurs frequently and sometimes affects her sleep. She hasn't been eating in the evenings. She says it seems worse in the last few weeks due to antihistamines. She denies any complications like dysphagia, nausea or vomiting. She has lost about ten pounds on purpose. She is requesting a prescription for Prilosec.     Review of Systems  As per HPI.     Objective:     Physical Exam   Constitutional: She is oriented to person, place, and time. She appears well-developed and well-nourished. No distress.   HENT:   Head: Normocephalic and atraumatic.   Eyes: EOM are normal.   Cardiovascular: Normal rate and regular rhythm.   Pulmonary/Chest: Effort normal and breath sounds normal. No respiratory distress. She has no wheezes.   Abdominal: Soft. Bowel sounds are normal. She exhibits no distension. There is no tenderness.   Neurological: She is alert and oriented to person, place, and time. No cranial nerve deficit.   Skin: She is not diaphoretic.   Psychiatric: Her behavior is normal.       Assessment:     1. Gastroesophageal reflux disease, esophagitis presence not specified        Plan:     She is about to lose her insurance due to a career move. I will send a prescription for 90 days. She was instructed to take it once daily. Only use bid as needed. Reviewed reflux precautions.     Medications Ordered This Encounter   Medications    omeprazole (PRILOSEC) 40 MG capsule     Sig: Take 1 capsule (40 mg total) by mouth 2 (two) times daily before meals.     Dispense:  180 capsule     Refill:  0       Orders Placed This Encounter   Procedures    FL Upper GI With KUB        Follow up if symptoms worsen or fail to improve.    Thank you for the opportunity to participate in the care of this patient.   Christopher Enamorado PA-C.

## 2020-03-03 ENCOUNTER — HOSPITAL ENCOUNTER (OUTPATIENT)
Dept: RADIOLOGY | Facility: HOSPITAL | Age: 30
Discharge: HOME | End: 2020-03-03
Attending: PHYSICIAN ASSISTANT
Payer: COMMERCIAL

## 2020-03-03 DIAGNOSIS — K21.9 GASTROESOPHAGEAL REFLUX DISEASE, ESOPHAGITIS PRESENCE NOT SPECIFIED: ICD-10-CM

## 2020-03-03 PROCEDURE — 74240 X-RAY XM UPR GI TRC 1CNTRST: CPT | Mod: TC

## 2020-03-03 PROCEDURE — 25500020 PHARM REV CODE 255: Performed by: PHYSICIAN ASSISTANT

## 2020-03-03 PROCEDURE — A9698 NON-RAD CONTRAST MATERIALNOC: HCPCS | Performed by: PHYSICIAN ASSISTANT

## 2020-03-03 RX ADMIN — BARIUM SULFATE 176 G: 960 POWDER, FOR SUSPENSION ORAL at 10:03

## 2020-03-03 RX ADMIN — BARIUM SULFATE 135 ML: 980 POWDER, FOR SUSPENSION ORAL at 10:03

## 2022-10-28 ENCOUNTER — OFFICE VISIT (OUTPATIENT)
Dept: GASTROENTEROLOGY | Facility: CLINIC | Age: 32
End: 2022-10-28
Payer: COMMERCIAL

## 2022-10-28 ENCOUNTER — TELEPHONE (OUTPATIENT)
Dept: GASTROENTEROLOGY | Facility: CLINIC | Age: 32
End: 2022-10-28

## 2022-10-28 VITALS
BODY MASS INDEX: 27.48 KG/M2 | DIASTOLIC BLOOD PRESSURE: 80 MMHG | HEIGHT: 67 IN | SYSTOLIC BLOOD PRESSURE: 114 MMHG | HEART RATE: 73 BPM | WEIGHT: 175.06 LBS | OXYGEN SATURATION: 100 %

## 2022-10-28 DIAGNOSIS — K21.9 GASTROESOPHAGEAL REFLUX DISEASE, UNSPECIFIED WHETHER ESOPHAGITIS PRESENT: Primary | ICD-10-CM

## 2022-10-28 DIAGNOSIS — R05.3 CHRONIC COUGH: ICD-10-CM

## 2022-10-28 PROCEDURE — 1160F PR REVIEW ALL MEDS BY PRESCRIBER/CLIN PHARMACIST DOCUMENTED: ICD-10-PCS | Mod: CPTII,S$GLB,, | Performed by: INTERNAL MEDICINE

## 2022-10-28 PROCEDURE — 1159F MED LIST DOCD IN RCRD: CPT | Mod: CPTII,S$GLB,, | Performed by: INTERNAL MEDICINE

## 2022-10-28 PROCEDURE — 99999 PR PBB SHADOW E&M-EST. PATIENT-LVL IV: ICD-10-PCS | Mod: PBBFAC,,, | Performed by: INTERNAL MEDICINE

## 2022-10-28 PROCEDURE — 3079F PR MOST RECENT DIASTOLIC BLOOD PRESSURE 80-89 MM HG: ICD-10-PCS | Mod: CPTII,S$GLB,, | Performed by: INTERNAL MEDICINE

## 2022-10-28 PROCEDURE — 3074F SYST BP LT 130 MM HG: CPT | Mod: CPTII,S$GLB,, | Performed by: INTERNAL MEDICINE

## 2022-10-28 PROCEDURE — 1159F PR MEDICATION LIST DOCUMENTED IN MEDICAL RECORD: ICD-10-PCS | Mod: CPTII,S$GLB,, | Performed by: INTERNAL MEDICINE

## 2022-10-28 PROCEDURE — 99214 OFFICE O/P EST MOD 30 MIN: CPT | Mod: S$GLB,,, | Performed by: INTERNAL MEDICINE

## 2022-10-28 PROCEDURE — 99999 PR PBB SHADOW E&M-EST. PATIENT-LVL IV: CPT | Mod: PBBFAC,,, | Performed by: INTERNAL MEDICINE

## 2022-10-28 PROCEDURE — 3079F DIAST BP 80-89 MM HG: CPT | Mod: CPTII,S$GLB,, | Performed by: INTERNAL MEDICINE

## 2022-10-28 PROCEDURE — 1160F RVW MEDS BY RX/DR IN RCRD: CPT | Mod: CPTII,S$GLB,, | Performed by: INTERNAL MEDICINE

## 2022-10-28 PROCEDURE — 3074F PR MOST RECENT SYSTOLIC BLOOD PRESSURE < 130 MM HG: ICD-10-PCS | Mod: CPTII,S$GLB,, | Performed by: INTERNAL MEDICINE

## 2022-10-28 PROCEDURE — 99214 PR OFFICE/OUTPT VISIT, EST, LEVL IV, 30-39 MIN: ICD-10-PCS | Mod: S$GLB,,, | Performed by: INTERNAL MEDICINE

## 2022-10-28 RX ORDER — PANTOPRAZOLE SODIUM 20 MG/1
20 TABLET, DELAYED RELEASE ORAL NIGHTLY
COMMUNITY
Start: 2022-08-26 | End: 2022-10-28 | Stop reason: DRUGHIGH

## 2022-10-28 RX ORDER — ESCITALOPRAM OXALATE 20 MG/1
20 TABLET ORAL DAILY
COMMUNITY
Start: 2022-07-15

## 2022-10-28 RX ORDER — PANTOPRAZOLE SODIUM 40 MG/1
40 TABLET, DELAYED RELEASE ORAL DAILY
Qty: 30 TABLET | Refills: 11 | Status: SHIPPED | OUTPATIENT
Start: 2022-10-28 | End: 2023-08-24 | Stop reason: SDUPTHER

## 2022-10-28 RX ORDER — FAMOTIDINE 40 MG/1
TABLET, FILM COATED ORAL
COMMUNITY

## 2022-10-28 NOTE — PROGRESS NOTES
Subjective:       Patient ID: Beth Hernandez is a 32 y.o. female.    Chief Complaint: Gastroesophageal Reflux (Pt. reported of coughing so much at night that, she can even get to use her C-pap machine)    The patient has a history of GERD complaints going back at least to 2015 our system.  She had issues with constipation and diarrhea as well as heartburn in 2015, and underwent both EGD and colonoscopy without significant findings.  She was also seen by our PA Christopher Enamorado in January 2019 and in February 2020 regarding issues surrounding heartburn, and in 2019 was started on daily PPI therapy.  She underwent an upper GI series in March of of 2020, also with no abnormalities found.    The patient states she has recently been changed from Omeprazole to Protonix, and feels that the latter has given her more benefit.  She also now, however, has issues with chronic cough.  She has a sensation that liquid comes to her throat and chokes her, even though she is not having the heartburn significantly as she had before.  With these things in mind, it is felt that the patient will benefit from a pH monitoring study to confirm her diagnosis and to see if alternative intervention may not be needed.    Review of Systems   Constitutional:  Negative for activity change, appetite change, chills, diaphoresis, fatigue, fever and unexpected weight change.   HENT:  Positive for postnasal drip. Negative for congestion, ear discharge, ear pain, hearing loss, nosebleeds and tinnitus.    Eyes:  Negative for photophobia and visual disturbance.   Respiratory:  Positive for cough. Negative for apnea, choking, chest tightness, shortness of breath and wheezing.    Cardiovascular:  Negative for chest pain, palpitations and leg swelling.   Gastrointestinal:  Negative for abdominal distention, abdominal pain, anal bleeding, blood in stool, constipation, diarrhea, nausea, rectal pain and vomiting.        SOLO   Genitourinary:  Negative for  difficulty urinating, dyspareunia, dysuria, flank pain, frequency, hematuria, menstrual problem, pelvic pain, urgency, vaginal bleeding and vaginal discharge.   Musculoskeletal:  Negative for arthralgias, back pain, gait problem, joint swelling, myalgias and neck stiffness.   Skin:  Negative for pallor and rash.   Neurological:  Positive for headaches. Negative for dizziness, tremors, seizures, syncope, speech difficulty, weakness and numbness.   Hematological:  Negative for adenopathy.   Psychiatric/Behavioral:  Negative for agitation, confusion, hallucinations, sleep disturbance and suicidal ideas.      Objective:      Physical Exam  Vitals reviewed.   Constitutional:       Appearance: She is well-developed.   HENT:      Head: Normocephalic and atraumatic.   Eyes:      General: No scleral icterus.        Right eye: No discharge.         Left eye: No discharge.   Neck:      Thyroid: No thyromegaly.      Vascular: No JVD.   Cardiovascular:      Rate and Rhythm: Normal rate and regular rhythm.      Heart sounds: Normal heart sounds. No murmur heard.    No friction rub. No gallop.   Pulmonary:      Effort: Pulmonary effort is normal. No respiratory distress.      Breath sounds: Normal breath sounds. No wheezing or rales.   Chest:      Chest wall: No tenderness.   Abdominal:      General: Bowel sounds are normal. There is no distension.      Palpations: Abdomen is soft. There is no mass.      Tenderness: There is no abdominal tenderness. There is no guarding or rebound.   Musculoskeletal:         General: Normal range of motion.      Cervical back: Normal range of motion and neck supple.   Lymphadenopathy:      Cervical: No cervical adenopathy.   Skin:     General: Skin is warm and dry.      Coloration: Skin is not pale.      Findings: No erythema or rash.   Neurological:      Mental Status: She is alert and oriented to person, place, and time.      Motor: No abnormal muscle tone.      Coordination: Coordination  normal.      Deep Tendon Reflexes: Reflexes are normal and symmetric.   Psychiatric:         Behavior: Behavior normal.         Thought Content: Thought content normal.         Judgment: Judgment normal.       Assessment:   Gastroesophageal reflux disease, unspecified whether esophagitis present  -     Ambulatory referral/consult to Endo Procedure ; Future; Expected date: 10/29/2022  -     Case Request Endoscopy: PH MONITORING, ESOPHAGUS, WIRELESS, (OFF REFLUX MEDS), EGD (ESOPHAGOGASTRODUODENOSCOPY)  -     pantoprazole (PROTONIX) 40 MG tablet; Take 1 tablet (40 mg total) by mouth once daily.  Dispense: 30 tablet; Refill: 11    Chronic cough  -     Ambulatory referral/consult to Endo Procedure ; Future; Expected date: 10/29/2022  -     Case Request Endoscopy: PH MONITORING, ESOPHAGUS, WIRELESS, (OFF REFLUX MEDS), EGD (ESOPHAGOGASTRODUODENOSCOPY)  -     pantoprazole (PROTONIX) 40 MG tablet; Take 1 tablet (40 mg total) by mouth once daily.  Dispense: 30 tablet; Refill: 11        Plan:   As above.

## 2022-10-28 NOTE — TELEPHONE ENCOUNTER
Good morning Dr. Abiel Rey has ordered a test on this pt, can you please assist in scheduling, thanks and be bless, have yourself a very nice happy weekend.

## 2022-11-01 ENCOUNTER — HOSPITAL ENCOUNTER (OUTPATIENT)
Dept: PREADMISSION TESTING | Facility: HOSPITAL | Age: 32
Discharge: HOME | End: 2022-11-01
Attending: INTERNAL MEDICINE
Payer: COMMERCIAL

## 2022-11-01 DIAGNOSIS — R05.3 CHRONIC COUGH: ICD-10-CM

## 2022-11-01 DIAGNOSIS — K21.9 GASTROESOPHAGEAL REFLUX DISEASE, UNSPECIFIED WHETHER ESOPHAGITIS PRESENT: ICD-10-CM

## 2022-11-28 ENCOUNTER — ANESTHESIA EVENT (OUTPATIENT)
Dept: ENDOSCOPY | Facility: HOSPITAL | Age: 32
End: 2022-11-28
Payer: COMMERCIAL

## 2022-11-28 NOTE — ANESTHESIA PREPROCEDURE EVALUATION
11/28/2022  Beth Hernandez is a 32 y.o., female.  Normal sinus rhythm   Normal ECG   No previous ECGs available   Confirmed by DONALD CAIN MD ([id number]) on 2/13/2020 6:16:12 PM   Past Medical History:   Diagnosis Date    Allergy     Anemia 3/13/2012 9:21:08 AM    [facility] Historical - Hematologic: Anemia-No Additional Notes    GERD (gastroesophageal reflux disease)     Headache 3/13/2012 9:18:30 AM    [facility] Historical - [facility]: Headache-No Additional Notes    Iron deficiency anemia     Vitamin D deficiency 7/5/2016 8:50:28 AM    [facility] Historical - Quick Add: Vitamin D deficiency-No Additional Notes     Past Surgical History:   Procedure Laterality Date    COLONOSCOPY N/A 9/17/2015    Procedure: COLONOSCOPY;  Surgeon: rFed Baez MD;  [facility];  Service: Endoscopy;  Laterality: N/A;    ESOPHAGOGASTRODUODENOSCOPY  2015    WISDOM TOOTH EXTRACTION     No current facility-administered medications for this encounter.    Current Outpatient Medications:     EScitalopram oxalate (LEXAPRO) 20 MG tablet, Take 20 mg by mouth once daily., Disp: , Rfl:     famotidine (PEPCID) 40 MG tablet, 1-2 times per day., Disp: , Rfl:     fexofenadine (ALLEGRA) 180 MG tablet, Take 180 mg by mouth as needed. , Disp: , Rfl:     pantoprazole (PROTONIX) 40 MG tablet, Take 1 tablet (40 mg total) by mouth once daily., Disp: 30 tablet, Rfl: 11          Pre-op Assessment    I have reviewed the Patient Summary Reports.     I have reviewed the Nursing Notes. I have reviewed the NPO Status.   I have reviewed the Medications.     Review of Systems  Anesthesia Hx:  No problems with previous Anesthesia  Neg history of prior surgery. Denies Family Hx of Anesthesia complications.   Denies Personal Hx of Anesthesia complications.   Social:  Non-Smoker, Social Alcohol Use    Hematology/Oncology:          -- Anemia:   Pulmonary:   Sleep Apnea, CPAP    Education provided regarding risk of obstructive sleep apnea  Medical reason for not educating patient about risks of obstructive sleep apnea   Hepatic/GI:   GERD, poorly controlled    Neurological:   Headaches        Physical Exam  General: Well nourished, Cooperative, Alert and Oriented    Airway:  Mallampati: I / I  Mouth Opening: Normal  TM Distance: Normal  Tongue: Normal  Neck ROM: Normal ROM    Dental:  Intact    Chest/Lungs:  Normal Respiratory Rate    Heart:  Rhythm: Regular Rhythm        Anesthesia Plan  Type of Anesthesia, risks & benefits discussed:    Anesthesia Type: Gen Natural Airway  Intra-op Monitoring Plan: Standard ASA Monitors  Post Op Pain Control Plan: multimodal analgesia  Induction:  IV  Informed Consent: Informed consent signed with the Patient and all parties understand the risks and agree with anesthesia plan.  All questions answered.   ASA Score: 2  Day of Surgery Review of History & Physical: H&P Update referred to the surgeon/provider.I have interviewed and examined the patient. I have reviewed the patient's H&P dated: There are no significant changes.     Ready For Surgery From Anesthesia Perspective.     .

## 2022-12-02 ENCOUNTER — ANESTHESIA (OUTPATIENT)
Dept: ENDOSCOPY | Facility: HOSPITAL | Age: 32
End: 2022-12-02
Payer: COMMERCIAL

## 2023-01-04 NOTE — PROGRESS NOTES
EGD/Bravo PAT call completed. Sister to come with patient, arrive at 10:30 am.   EGD instructions reviewed, NPO after midnight on 01/06/2023. Pt reports she has been holding PPI meds since 12/27/22.  Dress comfortable. No jewelry/piercings.   [facility], [address] [place] [address], next to [facility] building.   Pt verbalized understanding of all prep instructions and denies questions.   Please call for any questions prior to procedure date. [phone]

## 2023-01-06 ENCOUNTER — HOSPITAL ENCOUNTER (OUTPATIENT)
Facility: HOSPITAL | Age: 33
Discharge: HOME | End: 2023-01-06
Attending: INTERNAL MEDICINE | Admitting: INTERNAL MEDICINE
Payer: COMMERCIAL

## 2023-01-06 VITALS
TEMPERATURE: 98 F | OXYGEN SATURATION: 99 % | DIASTOLIC BLOOD PRESSURE: 78 MMHG | WEIGHT: 174.19 LBS | HEIGHT: 67 IN | RESPIRATION RATE: 18 BRPM | HEART RATE: 72 BPM | BODY MASS INDEX: 27.34 KG/M2 | SYSTOLIC BLOOD PRESSURE: 115 MMHG

## 2023-01-06 DIAGNOSIS — K21.9 GERD (GASTROESOPHAGEAL REFLUX DISEASE): ICD-10-CM

## 2023-01-06 DIAGNOSIS — R05.3 CHRONIC COUGH: ICD-10-CM

## 2023-01-06 DIAGNOSIS — K21.9 GASTROESOPHAGEAL REFLUX DISEASE, UNSPECIFIED WHETHER ESOPHAGITIS PRESENT: Primary | ICD-10-CM

## 2023-01-06 LAB
B-HCG UR QL: NEGATIVE
CTP QC/QA: YES

## 2023-01-06 PROCEDURE — 43235 EGD DIAGNOSTIC BRUSH WASH: CPT | Mod: ,,, | Performed by: INTERNAL MEDICINE

## 2023-01-06 PROCEDURE — D9220A PRA ANESTHESIA: Mod: CRNA,,, | Performed by: NURSE ANESTHETIST, CERTIFIED REGISTERED

## 2023-01-06 PROCEDURE — 27200942: Performed by: INTERNAL MEDICINE

## 2023-01-06 PROCEDURE — 63600175 PHARM REV CODE 636 W HCPCS: Performed by: INTERNAL MEDICINE

## 2023-01-06 PROCEDURE — 63600175 PHARM REV CODE 636 W HCPCS: Performed by: NURSE ANESTHETIST, CERTIFIED REGISTERED

## 2023-01-06 PROCEDURE — D9220A PRA ANESTHESIA: Mod: ANES,,, | Performed by: ANESTHESIOLOGY

## 2023-01-06 PROCEDURE — D9220A PRA ANESTHESIA: ICD-10-PCS | Mod: ANES,,, | Performed by: ANESTHESIOLOGY

## 2023-01-06 PROCEDURE — 91035 G-ESOPH REFLX TST W/ELECTROD: CPT | Mod: TC | Performed by: INTERNAL MEDICINE

## 2023-01-06 PROCEDURE — D9220A PRA ANESTHESIA: ICD-10-PCS | Mod: CRNA,,, | Performed by: NURSE ANESTHETIST, CERTIFIED REGISTERED

## 2023-01-06 PROCEDURE — 81025 URINE PREGNANCY TEST: CPT | Performed by: INTERNAL MEDICINE

## 2023-01-06 PROCEDURE — 37000008 HC ANESTHESIA 1ST 15 MINUTES: Performed by: INTERNAL MEDICINE

## 2023-01-06 PROCEDURE — 25000003 PHARM REV CODE 250: Performed by: NURSE ANESTHETIST, CERTIFIED REGISTERED

## 2023-01-06 PROCEDURE — 43235 PR EGD, FLEX, DIAGNOSTIC: ICD-10-PCS | Mod: ,,, | Performed by: INTERNAL MEDICINE

## 2023-01-06 PROCEDURE — 43235 EGD DIAGNOSTIC BRUSH WASH: CPT | Mod: 59 | Performed by: INTERNAL MEDICINE

## 2023-01-06 RX ORDER — PROPOFOL 10 MG/ML
VIAL (ML) INTRAVENOUS
Status: DISCONTINUED | OUTPATIENT
Start: 2023-01-06 | End: 2023-01-06

## 2023-01-06 RX ORDER — BUSPIRONE HYDROCHLORIDE 10 MG/1
20 TABLET ORAL DAILY
COMMUNITY

## 2023-01-06 RX ORDER — SODIUM CHLORIDE, SODIUM LACTATE, POTASSIUM CHLORIDE, CALCIUM CHLORIDE 600; 310; 30; 20 MG/100ML; MG/100ML; MG/100ML; MG/100ML
INJECTION, SOLUTION INTRAVENOUS CONTINUOUS
Status: ACTIVE | OUTPATIENT
Start: 2023-01-06

## 2023-01-06 RX ORDER — LIDOCAINE HYDROCHLORIDE 20 MG/ML
INJECTION, SOLUTION EPIDURAL; INFILTRATION; INTRACAUDAL; PERINEURAL
Status: DISCONTINUED | OUTPATIENT
Start: 2023-01-06 | End: 2023-01-06

## 2023-01-06 RX ADMIN — LIDOCAINE HYDROCHLORIDE 40 MG: 20 INJECTION, SOLUTION EPIDURAL; INFILTRATION; INTRACAUDAL; PERINEURAL at 11:01

## 2023-01-06 RX ADMIN — PROPOFOL 150 MG: 10 INJECTION, EMULSION INTRAVENOUS at 11:01

## 2023-01-06 RX ADMIN — PROPOFOL 100 MG: 10 INJECTION, EMULSION INTRAVENOUS at 11:01

## 2023-01-06 RX ADMIN — SODIUM CHLORIDE, POTASSIUM CHLORIDE, SODIUM LACTATE AND CALCIUM CHLORIDE: 600; 310; 30; 20 INJECTION, SOLUTION INTRAVENOUS at 10:01

## 2023-01-06 NOTE — ANESTHESIA POSTPROCEDURE EVALUATION
Anesthesia Post Evaluation    Patient: Beth Hernandez    Procedure(s) Performed: Procedure(s) (LRB):  EGD (ESOPHAGOGASTRODUODENOSCOPY) (N/A)  PH MONITORING, ESOPHAGUS, WIRELESS, (OFF REFLUX MEDS) (N/A)    Final Anesthesia Type: general      Patient location during evaluation: PACU  Patient participation: Yes- Able to Participate  Level of consciousness: awake and alert and oriented  Post-procedure vital signs: reviewed and stable  Pain management: adequate  Airway patency: patent    PONV status at discharge: No PONV  Anesthetic complications: no      Cardiovascular status: blood pressure returned to baseline, stable and hemodynamically stable  Respiratory status: unassisted  Hydration status: euvolemic  Follow-up not needed.          Vitals Value Taken Time   /68 01/06/23 1156   Temp 36.5 °C (97.7 °F) 01/06/23 1150   Pulse 90 01/06/23 1156   Resp 27 01/06/23 1156   SpO2 96 % 01/06/23 1156   Vitals shown include unvalidated device data.      No case tracking events are documented in the log.      Pain/Jessica Score: No data recorded

## 2023-01-06 NOTE — DISCHARGE SUMMARY
[facility]  Discharge Note  Short Stay    Procedure(s) (LRB):  EGD (ESOPHAGOGASTRODUODENOSCOPY) (N/A)  PH MONITORING, ESOPHAGUS, WIRELESS, (OFF REFLUX MEDS) (N/A)      OUTCOME: Patient tolerated treatment/procedure well without complication and is now ready for discharge.    DISPOSITION: Home or Self Care    FINAL DIAGNOSIS:  GERD (gastroesophageal reflux disease)    FOLLOWUP: With primary care provider    DISCHARGE INSTRUCTIONS:  No discharge procedures on file.

## 2023-01-06 NOTE — PROVATION PATIENT INSTRUCTIONS
Discharge Summary/Instructions after an Endoscopic Procedure  Patient Name: Beth Hernandez  Patient MRN: [id number]  Patient YOB: 1990 Friday, January 6, 2023  Shell Muñoz MD  Dear patient,  As a result of recent federal legislation (The Federal Cures Act), you may   receive lab or pathology results from your procedure in your [facility]   account before your physician is able to contact you. Your physician or   their representative will relay the results to you with their   recommendations at their soonest availability.  Thank you,  RESTRICTIONS:  During your procedure today, you received medications for sedation.  These   medications may affect your judgment, balance and coordination.  Therefore,   for 24 hours, you have the following restrictions:   - DO NOT drive a car, operate machinery, make legal/financial decisions,   sign important papers or drink alcohol.    ACTIVITY:  Today: no heavy lifting, straining or running due to procedural   sedation/anesthesia.  The following day: return to full activity including work.  DIET:  Eat and drink normally unless instructed otherwise.     TREATMENT FOR COMMON SIDE EFFECTS:  - Mild abdominal pain, nausea, belching, bloating or excessive gas:  rest,   eat lightly and use a heating pad.  - Sore Throat: treat with throat lozenges and/or gargle with warm salt   water.  - Because air was used during the procedure, expelling large amounts of air   from your rectum or belching is normal.  - If a bowel prep was taken, you may not have a bowel movement for 1-3 days.    This is normal.  SYMPTOMS TO WATCH FOR AND REPORT TO YOUR PHYSICIAN:  1. Abdominal pain or bloating, other than gas cramps.  2. Chest pain.  3. Back pain.  4. Signs of infection such as: chills or fever occurring within 24 hours   after the procedure.  5. Rectal bleeding, which would show as bright red, maroon, or black stools.   (A tablespoon of blood from the rectum is not serious, especially  if   hemorrhoids are present.)  6. Vomiting.  7. Weakness or dizziness.  GO DIRECTLY TO THE NEAREST EMERGENCY ROOM IF YOU HAVE ANY OF THE FOLLOWING:      Difficulty breathing              Chills and/or fever over 101 F   Persistent vomiting and/or vomiting blood   Severe abdominal pain   Severe chest pain   Black, tarry stools   Bleeding- more than one tablespoon   Any other symptom or condition that you feel may need urgent attention  Your doctor recommends these additional instructions:  If any biopsies were taken, your doctors clinic will contact you in 1 to 2   weeks with any results.  - Discharge patient to home (via wheelchair).   - Resume previous diet.   - Continue present medications.   - Patient has a contact number available for emergencies.  The signs and   symptoms of potential delayed complications were discussed with the   patient.  Return to normal activities tomorrow.  Written discharge   instructions were provided to the patient.   - Resume anticoagulant at prior dose.  For questions, problems or results please call your physician Shell Muñoz MD at Work:  [phone]  If you have any questions about the above instructions, call the GI   department at [phone] or call the endoscopy unit at [phone]   from 7am until 3 pm.  [facility], EMERGENCY ROOM PHONE NUMBER:   [phone]  IF A COMPLICATION OR EMERGENCY SITUATION ARISES AND YOU ARE UNABLE TO REACH   YOUR PHYSICIAN - GO DIRECTLY TO THE EMERGENCY ROOM.  I have read or have had read to me these discharge instructions for my   procedure and have received a written copy.  I understand these   instructions and will follow-up with my physician if I have any questions.     __________________________________       _____________________________________  Nurse Signature                                          Patient/Designated   Responsible Party Signature  MD Shell Jones,  MD  1/6/2023 11:49:42 AM  [employer]ATION

## 2023-01-06 NOTE — DISCHARGE SUMMARY
[facility] [place] [facility]  Discharge Note  Short Stay    Procedure(s) (LRB):  EGD (ESOPHAGOGASTRODUODENOSCOPY) (N/A)  PH MONITORING, ESOPHAGUS, WIRELESS, (OFF REFLUX MEDS) (N/A)      OUTCOME: Patient tolerated treatment/procedure well without complication and is now ready for discharge.    DISPOSITION: Home or Self Care    FINAL DIAGNOSIS:  GERD (gastroesophageal reflux disease)    FOLLOWUP: In clinic    DISCHARGE INSTRUCTIONS:  No discharge procedures on file.

## 2023-01-06 NOTE — PLAN OF CARE
Patient recovered to baseline. Instructions given. All questions answered. Discharged to designated  at this time.   [FreeTextEntry1] : \par The patient will benefit from an EGD/ colonoscopy. Risks, benefits, indications and alternatives discussed with patient who was amenable.\par The patient was given written instructions regarding the preparation. The day before the procedure only clear  liquids can be ingested. The patient will mix 238 grams of MiraLax mixed with 64 ounces gatorade. The patient was instructed to drink half  the mixture beginning at 5 pm the day before the colonoscopy and complete it before midnight. The other half should be ingested the morning of the procedure (making sure to complete it at least 2 hours before the scheduled procedure time).  The patient was also advised to take 20 mg oral Dulcolax pills the evening before the procedure. Nothing should be eaten after midnight. The patient was told how to manage their medications the day before and the day of the procedure.\par - on the day of the procedure, we will draw a full celiac panel including genetic testing

## 2023-01-06 NOTE — PLAN OF CARE
Discharge instructions reviewed with patient and visitor. Handouts given & verbalized understanding with no further questions at this time. Dr Muñoz spoke to pt at bedside, reviewed procedure and findings, answered questions. MD telephone number provided per AVS sheet. VSS on RA, no pain or nausea noted, tolerating po fluids, no complaints noted. Fall precautions reviewed, consents in chart, PIV to be removed at discharge.

## 2023-01-06 NOTE — H&P
Short Stay Endoscopy History and Physical    PCP - Carol Calvo MD    Procedure - EGD with Bravo  ASA - 2  Mallampati - per anesthesia  History of Anesthesia problems - no  Family history Anesthesia problems -  no     HPI:  This is a 32 y.o. female here for evaluation of :   Active Hospital Problems    Diagnosis  POA    *GERD (gastroesophageal reflux disease) [K21.9]  Yes    Chronic cough [R05.3]  No      Resolved Hospital Problems   No resolved problems to display.       ROS:  CONSTITUTIONAL: Denies weight change,  fatigue, fevers, chills, night sweats.  CARDIOVASCULAR: Denies chest pain, shortness of breath, orthopnea and edema.  RESPIRATORY: Denies cough, hemoptysis, dyspnea, and wheezing.  GI: See HPI.    Medical History:   Past Medical History:   Diagnosis Date    Allergy     Anemia 3/13/2012 9:21:08 AM    [facility] Historical - Hematologic: Anemia-No Additional Notes    GERD (gastroesophageal reflux disease)     Headache 3/13/2012 9:18:30 AM    [facility] Historical - [facility]: Headache-No Additional Notes    Iron deficiency anemia     Sleep apnea     Vitamin D deficiency 7/5/2016 8:50:28 AM    [facility] Historical - Quick Add: Vitamin D deficiency-No Additional Notes       Surgical History:   Past Surgical History:   Procedure Laterality Date    COLONOSCOPY N/A 9/17/2015    Procedure: COLONOSCOPY;  Surgeon: Fred Baez MD;  Location: [facility];  Service: Endoscopy;  Laterality: N/A;    ESOPHAGOGASTRODUODENOSCOPY  2015    WISDOM TOOTH EXTRACTION         Family History:  Family History   Problem Relation Age of Onset    Cancer Neg Hx        Social History:   Social History     Tobacco Use    Smoking status: Never    Smokeless tobacco: Never   Substance Use Topics    Alcohol use: Yes     Alcohol/week: 0.0 standard drinks     Comment: Socially    Drug use: No       Allergy  Review of patient's allergies indicates:   Allergen Reactions    Vitamin d  [cholecalciferol (vitamin d3)] Other (See  Comments)       Medications:   No current facility-administered medications on file prior to encounter.     Current Outpatient Medications on File Prior to Encounter   Medication Sig Dispense Refill    busPIRone (BUSPAR) 10 MG tablet Take 20 mg by mouth once daily.      EScitalopram oxalate (LEXAPRO) 20 MG tablet Take 20 mg by mouth once daily.      famotidine (PEPCID) 40 MG tablet 1-2 times per day.      fexofenadine (ALLEGRA) 180 MG tablet Take 180 mg by mouth as needed.       pantoprazole (PROTONIX) 40 MG tablet Take 1 tablet (40 mg total) by mouth once daily. 30 tablet 11       Physical Exam:  Vital Signs:   Vitals:    01/06/23 1043   BP: 112/70   Pulse: 80   Resp: 18   Temp: 97.8 °F (36.6 °C)     General Appearance: Well appearing in no acute distress  ENT: OP clear  Chest: CTA B  CV: RRR, no m/r/g  Abd: s/nt/nd/nabs  Ext: no edema    Labs:  Reviewed    IMP:  Active Hospital Problems    Diagnosis  POA    *GERD (gastroesophageal reflux disease) [K21.9]  Yes    Chronic cough [R05.3]  No      Resolved Hospital Problems   No resolved problems to display.         Plan:  I have explained the risks and benefits of endoscopy procedures to the patient including but not limited to bleeding, perforation, infection, and death. The patient wishes to proceed.

## 2023-01-11 DIAGNOSIS — R07.89 OTHER CHEST PAIN: Primary | ICD-10-CM

## 2023-01-12 ENCOUNTER — TELEPHONE (OUTPATIENT)
Dept: GASTROENTEROLOGY | Facility: CLINIC | Age: 33
End: 2023-01-12
Payer: COMMERCIAL

## 2023-01-12 PROCEDURE — 91035 G-ESOPH REFLX TST W/ELECTROD: CPT | Mod: 26,,, | Performed by: INTERNAL MEDICINE

## 2023-01-12 PROCEDURE — 91035 PR GERD TST W/ MUCOS PH ELECTROD: ICD-10-PCS | Mod: 26,,, | Performed by: INTERNAL MEDICINE

## 2023-01-12 NOTE — PROVATION PATIENT INSTRUCTIONS
Discharge Summary/Instructions after an Endoscopic Procedure  Patient Name: Beth Hernandez  Patient MRN: [id number]  Patient YOB: 1990 Friday, January 6, 2023 Alfonso France MD  Dear patient,  As a result of recent federal legislation (The Federal Cures Act), you may   receive lab or pathology results from your procedure in your [facility]   account before your physician is able to contact you. Your physician or   their representative will relay the results to you with their   recommendations at their soonest availability.  Thank you,  RESTRICTIONS:  During your procedure today, you received medications for sedation.  These   medications may affect your judgment, balance and coordination.  Therefore,   for 24 hours, you have the following restrictions:   - DO NOT drive a car, operate machinery, make legal/financial decisions,   sign important papers or drink alcohol.    ACTIVITY:  Today: no heavy lifting, straining or running due to procedural   sedation/anesthesia.  The following day: return to full activity including work.  DIET:  Eat and drink normally unless instructed otherwise.     TREATMENT FOR COMMON SIDE EFFECTS:  - Mild abdominal pain, nausea, belching, bloating or excessive gas:  rest,   eat lightly and use a heating pad.  - Sore Throat: treat with throat lozenges and/or gargle with warm salt   water.  - Because air was used during the procedure, expelling large amounts of air   from your rectum or belching is normal.  - If a bowel prep was taken, you may not have a bowel movement for 1-3 days.    This is normal.  SYMPTOMS TO WATCH FOR AND REPORT TO YOUR PHYSICIAN:  1. Abdominal pain or bloating, other than gas cramps.  2. Chest pain.  3. Back pain.  4. Signs of infection such as: chills or fever occurring within 24 hours   after the procedure.  5. Rectal bleeding, which would show as bright red, maroon, or black stools.   (A tablespoon of blood from the rectum is not serious, especially  if   hemorrhoids are present.)  6. Vomiting.  7. Weakness or dizziness.  GO DIRECTLY TO THE NEAREST EMERGENCY ROOM IF YOU HAVE ANY OF THE FOLLOWING:      Difficulty breathing              Chills and/or fever over 101 F   Persistent vomiting and/or vomiting blood   Severe abdominal pain   Severe chest pain   Black, tarry stools   Bleeding- more than one tablespoon   Any other symptom or condition that you feel may need urgent attention  Your doctor recommends these additional instructions:  If any biopsies were taken, your doctors clinic will contact you in 1 to 2   weeks with any results.  - Discharge patient to home.   - Optimize anti-reflux regimen.   - Return to referring physician.   For questions, problems or results please call your physician Alfonso France MD at Work:  [phone]  If you have any questions about the above instructions, call the GI   department at [phone] or call the endoscopy unit at [phone]   from 7am until 3 pm.  [facility], EMERGENCY ROOM PHONE NUMBER:   [phone]  IF A COMPLICATION OR EMERGENCY SITUATION ARISES AND YOU ARE UNABLE TO REACH   YOUR PHYSICIAN - GO DIRECTLY TO THE EMERGENCY ROOM.  I have read or have had read to me these discharge instructions for my   procedure and have received a written copy.  I understand these   instructions and will follow-up with my physician if I have any questions.     __________________________________       _____________________________________  Nurse Signature                                          Patient/Designated   Responsible Party Signature  Alfonso France MD  1/12/2023 9:56:29 AM  This report has been verified and signed electronically.  Dear patient,  As a result of recent federal legislation (The Federal Cures Act), you may   receive lab or pathology results from your procedure in your [facility]   account before your physician is able to contact you. Your physician or   their  representative will relay the results to you with their   recommendations at their soonest availability.  Thank you,  [employer]

## 2023-01-12 NOTE — TELEPHONE ENCOUNTER
Contacted pt and scheduled xray for tomorrow 01/13/23 at 11:45 at [facility] per pt request.     Cheek-To-Nose Interpolation Flap Text: A decision was made to reconstruct the defect utilizing an interpolation axial flap and a staged reconstruction.  A telfa template was made of the defect.  This telfa template was then used to outline the Cheek-To-Nose Interpolation flap.  The donor area for the pedicle flap was then injected with anesthesia.  The flap was excised through the skin and subcutaneous tissue down to the layer of the underlying musculature.  The interpolation flap was carefully excised within this deep plane to maintain its blood supply.  The edges of the donor site were undermined.   The donor site was closed in a primary fashion.  The pedicle was then rotated into position and sutured.  Once the tube was sutured into place, adequate blood supply was confirmed with blanching and refill.  The pedicle was then wrapped with xeroform gauze and dressed appropriately with a telfa and gauze bandage to ensure continued blood supply and protect the attached pedicle.

## 2023-01-12 NOTE — TELEPHONE ENCOUNTER
----- Message from Christopher Enamorado PA-C sent at 1/12/2023  2:15 PM CST -----  Regarding: FW: Bennett  Please follow up with patient.     ----- Message -----  From: Km Beatty RN  Sent: 1/12/2023   7:32 AM CST  To: Christopher Enamorado PA-C  Subject: RE: Bravo                                        I actually don't know how to schedule outpt imaging studies. Typically, if we have to do a KUB for a VCE one of the clinic staff sets that up. I apologize.  ----- Message -----  From: Christopher Enamorado PA-C  Sent: 1/11/2023   2:50 PM CST  To: Km Beatty RN  Subject: RE: Bravo                                        Won't hurt. If I put order in, can you contact her and get it set up? Also, see if she is taking a PPI or tried anything for it yet. ER for worsening symptoms incase it is something like heart and she just assumes it Bravo related since that was a recent intervention.   Christopher     ----- Message -----  From: Km Beatty RN  Sent: 1/11/2023   2:19 PM CST  To: Christopher Enamorado PA-C  Subject: Bennett                                            I am sorry for having to bother you with this, but this was one of Dr Beebe's patients. We did a Bravo on her on Friday, Jan 6. She called today and c/o pain to the chest and back area. I explained that the Bravo capsule could cause some discomfort. However, she wanted me to contact the ordering provider. We could do a chest x-ray to see if it's passed but it typically takes 7-10 days. Unsure what else we could do to ally her fears. Thank you.

## 2023-01-13 ENCOUNTER — HOSPITAL ENCOUNTER (OUTPATIENT)
Dept: RADIOLOGY | Facility: HOSPITAL | Age: 33
Discharge: HOME | End: 2023-01-13
Attending: PHYSICIAN ASSISTANT
Payer: COMMERCIAL

## 2023-01-13 DIAGNOSIS — R07.89 OTHER CHEST PAIN: ICD-10-CM

## 2023-01-13 PROCEDURE — 71046 XR CHEST PA AND LATERAL: ICD-10-PCS | Mod: 26,,, | Performed by: RADIOLOGY

## 2023-01-13 PROCEDURE — 71046 X-RAY EXAM CHEST 2 VIEWS: CPT | Mod: TC

## 2023-01-13 PROCEDURE — 71046 X-RAY EXAM CHEST 2 VIEWS: CPT | Mod: 26,,, | Performed by: RADIOLOGY

## 2023-08-24 ENCOUNTER — TELEPHONE (OUTPATIENT)
Dept: GASTROENTEROLOGY | Facility: CLINIC | Age: 33
End: 2023-08-24
Payer: COMMERCIAL

## 2023-08-24 DIAGNOSIS — R05.3 CHRONIC COUGH: ICD-10-CM

## 2023-08-24 DIAGNOSIS — K21.9 GASTROESOPHAGEAL REFLUX DISEASE, UNSPECIFIED WHETHER ESOPHAGITIS PRESENT: ICD-10-CM

## 2023-08-24 NOTE — TELEPHONE ENCOUNTER
----- Message from Christopher Enamorado PA-C sent at 8/24/2023 12:10 PM CDT -----  Contact: Beth  I need more information. What med? When was she last seen? Etc.   Christopher  ----- Message -----  From: Maylin Pringle LPN  Sent: 8/23/2023  12:00 AM CDT  To: Christopher Enamorado PA-C      ----- Message -----  From: Davina Warner RN  Sent: 8/22/2023  11:49 AM CDT  To: [facility] Staff      ----- Message -----  From: Nikkie Orellana  Sent: 8/22/2023  11:06 AM CDT  To: [facility] Clinical Staff    Patient is calling to speak with someone regarding medication refills. Patient reports provider has retired and is requesting prior authorization for medication refills. Please give patient a call back at [phone] when possible.  Thank you,  TH

## 2023-08-24 NOTE — TELEPHONE ENCOUNTER
Returned pts call. States she was seeing Dr Beebe. Needs a refill on her pantoprazole. Updated pharmacy.

## 2023-08-25 RX ORDER — PANTOPRAZOLE SODIUM 40 MG/1
40 TABLET, DELAYED RELEASE ORAL DAILY
Qty: 30 TABLET | Refills: 2 | Status: SHIPPED | OUTPATIENT
Start: 2023-08-25 | End: 2024-08-24

## 2024-01-02 ENCOUNTER — OFFICE VISIT (OUTPATIENT)
Dept: DERMATOLOGY | Facility: CLINIC | Age: 34
End: 2024-01-02
Payer: COMMERCIAL

## 2024-01-02 ENCOUNTER — LAB VISIT (OUTPATIENT)
Dept: LAB | Facility: HOSPITAL | Age: 34
End: 2024-01-02
Attending: DERMATOLOGY
Payer: COMMERCIAL

## 2024-01-02 VITALS — WEIGHT: 174.19 LBS | BODY MASS INDEX: 27.34 KG/M2 | HEIGHT: 67 IN

## 2024-01-02 DIAGNOSIS — L81.9 DYSCHROMIA: ICD-10-CM

## 2024-01-02 DIAGNOSIS — L71.9 ROSACEA: ICD-10-CM

## 2024-01-02 DIAGNOSIS — L70.0 ACNE VULGARIS: ICD-10-CM

## 2024-01-02 DIAGNOSIS — L85.8 KERATOSIS PILARIS: ICD-10-CM

## 2024-01-02 DIAGNOSIS — L30.0 NUMMULAR ECZEMA: ICD-10-CM

## 2024-01-02 DIAGNOSIS — L65.9 HAIR LOSS: ICD-10-CM

## 2024-01-02 DIAGNOSIS — L65.9 HAIR LOSS: Primary | ICD-10-CM

## 2024-01-02 LAB
BASOPHILS # BLD AUTO: 0.01 K/UL (ref 0–0.2)
BASOPHILS NFR BLD: 0.1 % (ref 0–1.9)
DIFFERENTIAL METHOD BLD: ABNORMAL
EOSINOPHIL # BLD AUTO: 0 K/UL (ref 0–0.5)
EOSINOPHIL NFR BLD: 0.4 % (ref 0–8)
ERYTHROCYTE [DISTWIDTH] IN BLOOD BY AUTOMATED COUNT: 13.2 % (ref 11.5–14.5)
FOLATE SERPL-MCNC: 5.8 NG/ML (ref 4–24)
HCT VFR BLD AUTO: 43.6 % (ref 37–48.5)
HGB BLD-MCNC: 13.8 G/DL (ref 12–16)
IMM GRANULOCYTES # BLD AUTO: 0.01 K/UL (ref 0–0.04)
IMM GRANULOCYTES NFR BLD AUTO: 0.1 % (ref 0–0.5)
LYMPHOCYTES # BLD AUTO: 1.5 K/UL (ref 1–4.8)
LYMPHOCYTES NFR BLD: 18.8 % (ref 18–48)
MCH RBC QN AUTO: 29.6 PG (ref 27–31)
MCHC RBC AUTO-ENTMCNC: 31.7 G/DL (ref 32–36)
MCV RBC AUTO: 93 FL (ref 82–98)
MONOCYTES # BLD AUTO: 0.6 K/UL (ref 0.3–1)
MONOCYTES NFR BLD: 7.9 % (ref 4–15)
NEUTROPHILS # BLD AUTO: 5.7 K/UL (ref 1.8–7.7)
NEUTROPHILS NFR BLD: 72.7 % (ref 38–73)
NRBC BLD-RTO: 0 /100 WBC
PLATELET # BLD AUTO: 390 K/UL (ref 150–450)
PMV BLD AUTO: 11 FL (ref 9.2–12.9)
RBC # BLD AUTO: 4.67 M/UL (ref 4–5.4)
TSH SERPL DL<=0.005 MIU/L-ACNC: 2.9 UIU/ML (ref 0.4–4)
VIT B12 SERPL-MCNC: 400 PG/ML (ref 210–950)
WBC # BLD AUTO: 7.82 K/UL (ref 3.9–12.7)

## 2024-01-02 PROCEDURE — 36415 COLL VENOUS BLD VENIPUNCTURE: CPT | Performed by: DERMATOLOGY

## 2024-01-02 PROCEDURE — 99204 OFFICE O/P NEW MOD 45 MIN: CPT | Mod: S$GLB,,, | Performed by: DERMATOLOGY

## 2024-01-02 PROCEDURE — 99999 PR PBB SHADOW E&M-EST. PATIENT-LVL III: CPT | Mod: PBBFAC,,, | Performed by: DERMATOLOGY

## 2024-01-02 PROCEDURE — 85025 COMPLETE CBC W/AUTO DIFF WBC: CPT | Performed by: DERMATOLOGY

## 2024-01-02 PROCEDURE — 1160F RVW MEDS BY RX/DR IN RCRD: CPT | Mod: CPTII,S$GLB,, | Performed by: DERMATOLOGY

## 2024-01-02 PROCEDURE — 3008F BODY MASS INDEX DOCD: CPT | Mod: CPTII,S$GLB,, | Performed by: DERMATOLOGY

## 2024-01-02 PROCEDURE — 1159F MED LIST DOCD IN RCRD: CPT | Mod: CPTII,S$GLB,, | Performed by: DERMATOLOGY

## 2024-01-02 PROCEDURE — 82746 ASSAY OF FOLIC ACID SERUM: CPT | Performed by: DERMATOLOGY

## 2024-01-02 PROCEDURE — 84443 ASSAY THYROID STIM HORMONE: CPT | Performed by: DERMATOLOGY

## 2024-01-02 PROCEDURE — 82607 VITAMIN B-12: CPT | Performed by: DERMATOLOGY

## 2024-01-02 RX ORDER — CLINDAMYCIN PHOSPHATE 10 MG/ML
SOLUTION TOPICAL 2 TIMES DAILY
Qty: 60 EACH | Refills: 5 | Status: SHIPPED | OUTPATIENT
Start: 2024-01-02 | End: 2025-01-01

## 2024-01-02 RX ORDER — AMMONIUM LACTATE 12 G/100G
LOTION TOPICAL
Qty: 225 G | Refills: 11 | Status: SHIPPED | OUTPATIENT
Start: 2024-01-02

## 2024-01-02 RX ORDER — TRIAMCINOLONE ACETONIDE 1 MG/G
CREAM TOPICAL
Qty: 80 G | Refills: 1 | Status: SHIPPED | OUTPATIENT
Start: 2024-01-02

## 2024-01-02 RX ORDER — AZELAIC ACID 0.15 G/G
AEROSOL, FOAM TOPICAL
Qty: 50 G | Refills: 5 | Status: SHIPPED | OUTPATIENT
Start: 2024-01-02

## 2024-01-02 NOTE — PATIENT INSTRUCTIONS
Nutrafol, AG-Pro or Viviscal      Use 5% Rogaine (or generic minoxidil) solution or foam daily.  Apply directly to balding areas of the scalp.  Use for at least 3 months to assess hair growth.     Patient Education   Minoxidil Topical foam   Minoxidil Topical solution    Minoxidil Topical solution   What is this medicine?   MINOXIDIL (mi NOX i dill) is used to increase new hair growth in cases of hereditary hair loss. When applied to the scalp, this medicine can promote hair growth in men with male pattern baldness. This medicine can also help women with thin hair and frontal hair loss. Women should not use extra-strength minoxidil products.   This medicine may be used for other purposes; ask your health care provider or pharmacist if you have questions.   What should I tell my health care provider before I take this medicine?   They need to know if you have any of these conditions:   frontal hair loss or receding hairline   no family history of hair loss   sudden or patchy hair loss   unknown reason for hair loss   your scalp is red, inflamed, infected or painful   an unusual or allergic reaction to minoxidil, other medicines, foods, dyes, or preservatives   pregnant or trying to get pregnant   breast-feeding  How should I use this medicine?   This medicine is for external use on the scalp only. Do not take by mouth. Follow the directions that come with the product. The hair and scalp should be dry before you use this medicine. You do not need to shampoo your hair before each application. Do not use more often than directed.   Talk to your pediatrician regarding the use of this medicine in children. This medicine is not approved for use in children.   Overdosage: If you think you have taken too much of this medicine contact a poison control center or emergency room at once.   NOTE: This medicine is only for you. Do not share this medicine with others.   What if I miss a dose?   If you miss a dose, use it as soon as  you can. If it is almost time for your next dose, use only that dose. Do not use double or extra doses.   What may interact with this medicine?   Interactions are not expected. Do not use any other medicines on the scalp without asking your doctor or health care professional.   This list may not describe all possible interactions. Give your health care provider a list of all the medicines, herbs, non-prescription drugs, or dietary supplements you use. Also tell them if you smoke, drink alcohol, or use illegal drugs. Some items may interact with your medicine.   What should I watch for while using this medicine?   This medicine must be used on a regular basis for your hair to regrow. It may take 2 to 4 months of regular use before you notice any improvement. It is important to continue to use this product to maintain regrowth of hair. Once you stop using it, the regrown hair will usually fall out within 3 months. If you do not see any new hair growth after 4 months, stop using this product and contact your doctor or health care professional.   Do not get this medicine in your eyes, nose, mouth, or on other sensitive areas. If you do, rinse off with plenty of cool tap water. Always wash your hands after use. Do not apply if your scalp is cut, scraped, or sunburned.   Some people may notice changes in hair color or texture after using this medicine.   What side effects may I notice from receiving this medicine?   Side effects that you should report to your doctor or health care professional as soon as possible:   chest pain or palpitations   dizziness or fainting   skin rash, blisters, or itching   sudden weight gain   swelling of the hands or feet  Side effects that usually do not require medical attention (report to your doctor or health care professional if they continue or are bothersome):   headache   redness, irritation and itching at the site of application   unusual hair growth, on the face, arm, and back  This  list may not describe all possible side effects. Call your doctor for medical advice about side effects. You may report side effects to FDA at 9-482-VEZ-1341.   Where should I keep my medicine?   Keep out of the reach of children.   Store at room temperature between 20 and 25 degrees C (68 and 77 degrees F). Some products may be flammable. Keep away from heat, fire or flame. Throw away any unused medicine after the expiration date.   NOTE:This sheet is a summary. It may not cover all possible information. If you have questions about this medicine, talk to your doctor, pharmacist, or health care provider. Copyright© 2014 [employer]

## 2024-01-02 NOTE — PROGRESS NOTES
Subjective:      Patient ID:  Beth Hernanedz is a 33 y.o. female who presents for   Chief Complaint   Patient presents with    Acne     History of Present Illness: The patient presents with chief complaint of acne, discoloration.  Location: face  Duration: several months  Signs/Symptoms: discoloration    Prior treatments: oil of olay serum with lactic acid (irritation), ceraVe retinol, ceraVe vitamin C, ceraVe niacinamide serum, BHA    She also c/o keratosis pilaris, using ceraVe SA cleanser and lotion/eucerin with urea    She also c/o hair thinning. No tx tried.     + regular menses    Family hx of hair thinning of paternal side          Review of Systems   Constitutional:  Negative for fever and chills.   Gastrointestinal:  Negative for nausea and vomiting.   Skin:  Positive for activity-related sunscreen use. Negative for daily sunscreen use and recent sunburn.   Hematologic/Lymphatic: Does not bruise/bleed easily.       Objective:   Physical Exam   Constitutional: She appears well-developed and well-nourished. No distress.   Neurological: She is alert and oriented to person, place, and time. She is not disoriented.   Psychiatric: She has a normal mood and affect.   Skin:   Areas Examined (abnormalities noted in diagram):   Head / Face Inspection Performed  Neck Inspection Performed  Chest / Axilla Inspection Performed  RUE Inspected  LUE Inspection Performed  Nails and Digits Inspection Performed            Diagram Legend     Erythematous scaling macule/papule c/w actinic keratosis       Vascular papule c/w angioma      Pigmented verrucoid papule/plaque c/w seborrheic keratosis      Yellow umbilicated papule c/w sebaceous hyperplasia      Irregularly shaped tan macule c/w lentigo     1-2 mm smooth white papules consistent with Milia      Movable subcutaneous cyst with punctum c/w epidermal inclusion cyst      Subcutaneous movable cyst c/w pilar cyst      Firm pink to brown papule c/w dermatofibroma       Pedunculated fleshy papule(s) c/w skin tag(s)      Evenly pigmented macule c/w junctional nevus     Mildly variegated pigmented, slightly irregular-bordered macule c/w mildly atypical nevus      Flesh colored to evenly pigmented papule c/w intradermal nevus       Pink pearly papule/plaque c/w basal cell carcinoma      Erythematous hyperkeratotic cursted plaque c/w SCC      Surgical scar with no sign of skin cancer recurrence      Open and closed comedones      Inflammatory papules and pustules      Verrucoid papule consistent consistent with wart     Erythematous eczematous patches and plaques     Dystrophic onycholytic nail with subungual debris c/w onychomycosis     Umbilicated papule    Erythematous-base heme-crusted tan verrucoid plaque consistent with inflamed seborrheic keratosis     Erythematous Silvery Scaling Plaque c/w Psoriasis     See annotation      Assessment / Plan:        Hair loss  -     CBC Auto Differential; Future; Expected date: 01/02/2024  -     TSH; Future; Expected date: 01/02/2024  -     FOLATE; Future; Expected date: 01/02/2024  -     VITAMIN B12; Future; Expected date: 01/02/2024  -     mild. possible early female pattern. Discussed OTC biotin. Will check labs and discussed OTC minoxidil. Discussed risks, benefits and side effect profile of minoxidil, including irritation, dryness, and unwanted facial hair growth.  Discussed that patient should undertake 6 month trial before cessation if no improvement.  Discussed that patient may note hair loss prior to hair regrowth in the first 4 weeks. Patient instructed on how to apply the medication.    Acne vulgaris  Dyschromia  -     clindamycin (CLEOCIN T) 1 % Swab; Apply topically 2 (two) times daily.  Dispense: 60 each; Refill: 5  -     will start above med. Possible rosacea component. Continue CeraVe cleanser.    Keratosis pilaris  -     ammonium lactate (LAC-HYDRIN) 12 % lotion; Apply to damp skin after bathing  Dispense: 225 g; Refill: 11  -      Discussed dry skin care, AVS given.  Will start ammonium lactate 12% lotion daily to damp skin.     Nummular eczema  -     triamcinolone acetonide 0.1% (KENALOG) 0.1 % cream; AAA bid prn eczema. Do not use on face.  Dispense: 80 g; Refill: 1  -     of the left flank. Will start above med.     Rosacea  -     azelaic acid (FINACEA) 15 % Foam; AAA of face bid  Dispense: 50 g; Refill: 5  -     of the bilateral cheeks, consider doxy if fails to improve.  Will start above med.          Follow up in about 3 months (around 4/2/2024).

## 2024-04-10 ENCOUNTER — TELEPHONE (OUTPATIENT)
Dept: DERMATOLOGY | Facility: CLINIC | Age: 34
End: 2024-04-10
Payer: COMMERCIAL

## 2024-04-10 NOTE — TELEPHONE ENCOUNTER
Returned call. Pt offered to be seen sooner via virtual pt declined. Pt appt in June was added to the waitlist.   ----- Message from Miriam Delcid sent at 4/10/2024  6:25 AM CDT -----  Regarding: soon appt  Contact: Beth  .Type:  Sooner Appointment Request    Caller is requesting a sooner appointment.  Caller declined first available appointment listed below.  Caller will not accept being placed on the waitlist and is requesting a message be sent to doctor.  Name of Caller: Beth  When is the first available appointment?  Symptoms:  Would the patient rather a call back or a response via [facility]? call  Best Call Back Number: [phone]  Additional Information:  Beth was scheduled for 04/10/2024 and decided to reschedule due to the weather. She accepted [facility] but want to be seen sooner. Please call the patient.

## 2024-06-20 ENCOUNTER — OFFICE VISIT (OUTPATIENT)
Dept: DERMATOLOGY | Facility: CLINIC | Age: 34
End: 2024-06-20
Payer: COMMERCIAL

## 2024-06-20 DIAGNOSIS — L85.8 KERATOSIS PILARIS: ICD-10-CM

## 2024-06-20 DIAGNOSIS — L70.0 ACNE VULGARIS: ICD-10-CM

## 2024-06-20 DIAGNOSIS — L71.9 ROSACEA: ICD-10-CM

## 2024-06-20 DIAGNOSIS — L81.9 DYSCHROMIA: ICD-10-CM

## 2024-06-20 DIAGNOSIS — L65.9 HAIR LOSS: Primary | ICD-10-CM

## 2024-06-20 PROCEDURE — 1160F RVW MEDS BY RX/DR IN RCRD: CPT | Mod: CPTII,S$GLB,, | Performed by: DERMATOLOGY

## 2024-06-20 PROCEDURE — 99213 OFFICE O/P EST LOW 20 MIN: CPT | Mod: S$GLB,,, | Performed by: DERMATOLOGY

## 2024-06-20 PROCEDURE — 99999 PR PBB SHADOW E&M-EST. PATIENT-LVL II: CPT | Mod: PBBFAC,,, | Performed by: DERMATOLOGY

## 2024-06-20 PROCEDURE — 1159F MED LIST DOCD IN RCRD: CPT | Mod: CPTII,S$GLB,, | Performed by: DERMATOLOGY

## 2024-06-20 RX ORDER — CLINDAMYCIN PHOSPHATE 10 MG/ML
SOLUTION TOPICAL 2 TIMES DAILY
Qty: 60 EACH | Refills: 5 | Status: SHIPPED | OUTPATIENT
Start: 2024-06-20 | End: 2025-06-20

## 2024-06-20 RX ORDER — OMEPRAZOLE 20 MG/1
20 CAPSULE, DELAYED RELEASE ORAL DAILY
COMMUNITY

## 2024-06-20 RX ORDER — LISDEXAMFETAMINE DIMESYLATE CAPSULES 20 MG/1
20 CAPSULE ORAL EVERY MORNING
COMMUNITY

## 2024-06-20 NOTE — PROGRESS NOTES
Subjective:      Patient ID:  Beth Hernandez is a 34 y.o. female who presents for   No chief complaint on file.    Hx of KP, hair loss, acne, nummular eczema, rosacea, last seen on 1/2/24.  She is currently using clinda wipes bid.  She did not use the finacea foam due to concern of increased sun sensitivity.    For KP, she uses amlactin lotion and CeraVe SA cleanser with improvement in turnover of her skin/smoothing of skin.     Nummular eczema improved with TAC 0.1% cream.    Prior treatments: oil of olay serum with lactic acid (irritation), ceraVe retinol, ceraVe vitamin C, ceraVe niacinamide serum, BHA, finacea foam, clinda wipes,     For hair loss, she is using minoxidil 5% solution qD. CBC, B12, folate, and TSH wnl.         Review of Systems   Constitutional:  Negative for fever and chills.   Gastrointestinal:  Negative for nausea and vomiting.   Skin:  Positive for activity-related sunscreen use. Negative for daily sunscreen use and recent sunburn.   Hematologic/Lymphatic: Does not bruise/bleed easily.       Objective:   Physical Exam   Constitutional: She appears well-developed and well-nourished. No distress.   Neurological: She is alert and oriented to person, place, and time. She is not disoriented.   Psychiatric: She has a normal mood and affect.   Skin:   Areas Examined (abnormalities noted in diagram):   Scalp / Hair Palpated and Inspected  Head / Face Inspection Performed  Neck Inspection Performed  RUE Inspected  LUE Inspection Performed  Nails and Digits Inspection Performed           Assessment / Plan:        Hair loss  Continue minoxidil 5% daily.  + improvement.  Discussed OTC biotin.    Acne vulgaris  Rosacea  Dyschromia  -     clindamycin (CLEOCIN T) 1 % Swab; Apply topically 2 (two) times daily.  Dispense: 60 each; Refill: 5  -     possibly improving, will continue above med and recommend consider start finacea foam bid.  Will give samples of daily sunscreen.     Keratosis  pilaris  Continue amlactin lotion qD        Follow up if symptoms worsen or fail to improve.

## 2024-07-06 ENCOUNTER — HOSPITAL ENCOUNTER (EMERGENCY)
Facility: HOSPITAL | Age: 34
Discharge: HOME | End: 2024-07-06
Attending: STUDENT IN AN ORGANIZED HEALTH CARE EDUCATION/TRAINING PROGRAM
Payer: COMMERCIAL

## 2024-07-06 VITALS
WEIGHT: 195 LBS | TEMPERATURE: 98 F | BODY MASS INDEX: 30.54 KG/M2 | SYSTOLIC BLOOD PRESSURE: 122 MMHG | DIASTOLIC BLOOD PRESSURE: 84 MMHG | OXYGEN SATURATION: 98 % | RESPIRATION RATE: 18 BRPM | HEART RATE: 99 BPM

## 2024-07-06 DIAGNOSIS — M79.604 RIGHT LEG PAIN: ICD-10-CM

## 2024-07-06 LAB
ALBUMIN SERPL BCP-MCNC: 3.7 G/DL (ref 3.5–5.2)
ALP SERPL-CCNC: 93 U/L (ref 55–135)
ALT SERPL W/O P-5'-P-CCNC: 18 U/L (ref 10–44)
ANION GAP SERPL CALC-SCNC: 10 MMOL/L (ref 8–16)
APTT PPP: 25.7 SEC (ref 21–32)
AST SERPL-CCNC: 31 U/L (ref 10–40)
BASOPHILS # BLD AUTO: 0.02 K/UL (ref 0–0.2)
BASOPHILS NFR BLD: 0.2 % (ref 0–1.9)
BILIRUB SERPL-MCNC: 0.1 MG/DL (ref 0.1–1)
BUN SERPL-MCNC: 11 MG/DL (ref 6–20)
CALCIUM SERPL-MCNC: 9 MG/DL (ref 8.7–10.5)
CHLORIDE SERPL-SCNC: 110 MMOL/L (ref 95–110)
CO2 SERPL-SCNC: 22 MMOL/L (ref 23–29)
CREAT SERPL-MCNC: 0.9 MG/DL (ref 0.5–1.4)
DIFFERENTIAL METHOD BLD: ABNORMAL
EOSINOPHIL # BLD AUTO: 0.1 K/UL (ref 0–0.5)
EOSINOPHIL NFR BLD: 1.4 % (ref 0–8)
ERYTHROCYTE [DISTWIDTH] IN BLOOD BY AUTOMATED COUNT: 19.1 % (ref 11.5–14.5)
EST. GFR  (NO RACE VARIABLE): >60 ML/MIN/1.73 M^2
GLUCOSE SERPL-MCNC: 113 MG/DL (ref 70–110)
HCT VFR BLD AUTO: 39.1 % (ref 37–48.5)
HGB BLD-MCNC: 12.5 G/DL (ref 12–16)
IMM GRANULOCYTES # BLD AUTO: 0.02 K/UL (ref 0–0.04)
IMM GRANULOCYTES NFR BLD AUTO: 0.2 % (ref 0–0.5)
INR PPP: 0.8 (ref 0.8–1.2)
LYMPHOCYTES # BLD AUTO: 2 K/UL (ref 1–4.8)
LYMPHOCYTES NFR BLD: 20.2 % (ref 18–48)
MCH RBC QN AUTO: 26.9 PG (ref 27–31)
MCHC RBC AUTO-ENTMCNC: 32 G/DL (ref 32–36)
MCV RBC AUTO: 84 FL (ref 82–98)
MONOCYTES # BLD AUTO: 0.6 K/UL (ref 0.3–1)
MONOCYTES NFR BLD: 6.1 % (ref 4–15)
NEUTROPHILS # BLD AUTO: 7 K/UL (ref 1.8–7.7)
NEUTROPHILS NFR BLD: 71.9 % (ref 38–73)
NRBC BLD-RTO: 0 /100 WBC
PLATELET # BLD AUTO: 363 K/UL (ref 150–450)
PMV BLD AUTO: 10.2 FL (ref 9.2–12.9)
POTASSIUM SERPL-SCNC: 4.6 MMOL/L (ref 3.5–5.1)
PROT SERPL-MCNC: 7.6 G/DL (ref 6–8.4)
PROTHROMBIN TIME: 9.9 SEC (ref 9–12.5)
RBC # BLD AUTO: 4.64 M/UL (ref 4–5.4)
SODIUM SERPL-SCNC: 142 MMOL/L (ref 136–145)
WBC # BLD AUTO: 9.7 K/UL (ref 3.9–12.7)

## 2024-07-06 PROCEDURE — 99284 EMERGENCY DEPT VISIT MOD MDM: CPT | Mod: 25

## 2024-07-06 PROCEDURE — 85025 COMPLETE CBC W/AUTO DIFF WBC: CPT | Performed by: NURSE PRACTITIONER

## 2024-07-06 PROCEDURE — 85730 THROMBOPLASTIN TIME PARTIAL: CPT | Performed by: NURSE PRACTITIONER

## 2024-07-06 PROCEDURE — 85610 PROTHROMBIN TIME: CPT | Performed by: NURSE PRACTITIONER

## 2024-07-06 PROCEDURE — 80053 COMPREHEN METABOLIC PANEL: CPT | Performed by: NURSE PRACTITIONER

## 2024-07-06 RX ORDER — KETOROLAC TROMETHAMINE 10 MG/1
10 TABLET, FILM COATED ORAL EVERY 6 HOURS
Qty: 20 TABLET | Refills: 0 | Status: SHIPPED | OUTPATIENT
Start: 2024-07-06 | End: 2024-07-11

## 2024-07-06 RX ORDER — DEXBROMPHENIRAMINE MALEATE AND PHENYLEPHRINE HYDROCHLORIDE 2; 10 MG/1; MG/1
TABLET ORAL
COMMUNITY
Start: 2024-05-12

## 2024-07-06 RX ORDER — ATOMOXETINE 40 MG/1
CAPSULE ORAL
COMMUNITY
Start: 2024-06-25

## 2024-07-06 RX ORDER — NIRMATRELVIR AND RITONAVIR 300-100 MG
KIT ORAL
COMMUNITY
Start: 2024-07-02

## 2024-07-06 RX ORDER — DOXEPIN HYDROCHLORIDE 10 MG/1
10-20 CAPSULE ORAL NIGHTLY
COMMUNITY
Start: 2024-06-25

## 2024-07-06 NOTE — FIRST PROVIDER EVALUATION
Medical screening examination initiated.  I have conducted a focused provider triage encounter, findings are as follows:    Brief history of present illness:  Patient reports pain to right calf.    Vitals:    07/06/24 1807 07/06/24 1808   BP: 134/76    BP Location: Right arm    Patient Position: Sitting    Pulse: (!) 112    Resp: 18    Temp:  98 °F (36.7 °C)   TempSrc: Oral Oral   SpO2: 98%    Weight: 88.5 kg (195 lb)        Pertinent physical exam:  Tachycardic    Brief workup plan:  Labs, imaging    Preliminary workup initiated; this workup will be continued and followed by the physician or advanced practice provider that is assigned to the patient when roomed.

## 2024-07-07 NOTE — ED PROVIDER NOTES
SCRIBE #1 NOTE: ICarmen, am scribing for, and in the presence of, Howard Yeboah MD. I have scribed the entire note.       History     Chief Complaint   Patient presents with    Leg Pain     Pt c/o right leg pain on calf x2 days, dx with covid last Friday.        Review of patient's allergies indicates:  No Known Allergies      History of Present Illness     HPI    7/6/2024, 8:20 PM  History obtained from the patient      History of Present Illness: Beth Hernandez is a 34 y.o. female patient with a PMHx of GERD, iron deficiency anemia, vitamin D deficiency, and sleep apnea who presents to the Emergency Department for evaluation of R-calf pain which onset several days ago. Pt states that she was diagnosed with COVID last Friday and is concerned she may have a blood clot. Pt's pain is exacerbated by walking and pressing her heel down. Pt has not other complaints. Symptoms are constant and moderate in severity. No associated sxs reported. Patient denies any fever, , SOB, dysuria, and all other sxs at this time. No prior Tx reported. No further complaints or concerns at this time.       Arrival mode: Personal vehicle     PCP: Carol Sellers MD        Past Medical History:  Past Medical History:   Diagnosis Date    Allergy     Anemia 3/13/2012 9:21:08 AM    [facility] [place] Historical - Hematologic: Anemia-No Additional Notes    GERD (gastroesophageal reflux disease)     Headache 3/13/2012 9:18:30 AM    [facility] [place] Historical - [facility]: Headache-No Additional Notes    Iron deficiency anemia     Sleep apnea     Vitamin D deficiency 7/5/2016 8:50:28 AM    [facility] [place] Historical - Quick Add: Vitamin D deficiency-No Additional Notes       Past Surgical History:  Past Surgical History:   Procedure Laterality Date    COLONOSCOPY N/A 9/17/2015    Procedure: COLONOSCOPY;  Surgeon: Fred Baez MD;  Location: [facility];  Service: Endoscopy;  Laterality: N/A;    ESOPHAGOGASTRODUODENOSCOPY  2015     ESOPHAGOGASTRODUODENOSCOPY N/A 1/6/2023    Procedure: EGD (ESOPHAGOGASTRODUODENOSCOPY);  Surgeon: Shell Muñoz MD;  Location: [facility];  Service: Endoscopy;  Laterality: N/A;    PH MONITORING, ESOPHAGUS, WIRELESS, (OFF REFLUX MEDS) N/A 1/6/2023    Procedure: PH MONITORING, ESOPHAGUS, WIRELESS, (OFF REFLUX MEDS);  Surgeon: Shell Muñoz MD;  Location: [facility];  Service: Endoscopy;  Laterality: N/A;    WISDOM TOOTH EXTRACTION           Family History:  Family History   Problem Relation Name Age of Onset    Cancer Neg Hx         Social History:  Social History     Tobacco Use    Smoking status: Never    Smokeless tobacco: Never   Substance and Sexual Activity    Alcohol use: Yes     Alcohol/week: 0.0 standard drinks of alcohol     Comment: Socially    Drug use: No    Sexual activity: Not on file        Review of Systems     Review of Systems   Constitutional:  Negative for fever.   HENT:  Negative for sore throat.    Respiratory:  Negative for shortness of breath.    Cardiovascular:  Negative for chest pain.   Gastrointestinal:  Negative for nausea.   Genitourinary:  Negative for dysuria.   Musculoskeletal:  Negative for back pain.        (+) R-calf pain   Skin:  Negative for rash.   Neurological:  Negative for weakness.   Hematological:  Does not bruise/bleed easily.      Physical Exam     Initial Vitals   BP Pulse Resp Temp SpO2   07/06/24 1807 07/06/24 1807 07/06/24 1807 07/06/24 1808 07/06/24 1807   134/76 (!) 112 18 98 °F (36.7 °C) 98 %      MAP       --                 Physical Exam  Nursing Notes and Vital Signs Reviewed.  Constitutional: Patient is in no acute distress. Well-developed and well-nourished.  Head: Atraumatic. Normocephalic.  Eyes: PERRL. EOM intact. Conjunctivae are not pale. No scleral icterus.  ENT: Mucous membranes are moist. Oropharynx is clear and symmetric.    Neck: Supple. Full ROM. No lymphadenopathy.  Cardiovascular: Regular rate. Regular rhythm. No murmurs, rubs, or  gallops. Distal pulses are 2+ and symmetric.  Pulmonary/Chest: No respiratory distress. Clear to auscultation bilaterally. No wheezing or rales.  Abdominal: Soft and non-distended.  There is no tenderness.  No rebound, guarding, or rigidity. Good bowel sounds.  Genitourinary: No CVA tenderness  Musculoskeletal: Moves all extremities. No obvious deformities. No edema. Tenderness to R-calf.  Skin: Warm and dry.  Neurological:  Alert, awake, and appropriate.  Normal speech.  No acute focal neurological deficits are appreciated.  Psychiatric: Normal affect. Good eye contact. Appropriate in content.     ED Course   Procedures  ED Vital Signs:  Vitals:    07/06/24 1807 07/06/24 1808 07/06/24 1952 07/06/24 2054   BP: 134/76  122/84 122/84   Pulse: (!) 112  98 99   Resp: 18  18 18   Temp:  98 °F (36.7 °C)     TempSrc: Oral Oral     SpO2: 98%  98% 98%   Weight: 88.5 kg (195 lb)          Abnormal Lab Results:  Labs Reviewed   CBC W/ AUTO DIFFERENTIAL - Abnormal; Notable for the following components:       Result Value    MCH 26.9 (*)     RDW 19.1 (*)     All other components within normal limits   COMPREHENSIVE METABOLIC PANEL - Abnormal; Notable for the following components:    CO2 22 (*)     Glucose 113 (*)     All other components within normal limits   PROTIME-INR   APTT        All Lab Results:  Results for orders placed or performed during the hospital encounter of 07/06/24   CBC auto differential   Result Value Ref Range    WBC 9.70 3.90 - 12.70 K/uL    RBC 4.64 4.00 - 5.40 M/uL    Hemoglobin 12.5 12.0 - 16.0 g/dL    Hematocrit 39.1 37.0 - 48.5 %    MCV 84 82 - 98 fL    MCH 26.9 (L) 27.0 - 31.0 pg    MCHC 32.0 32.0 - 36.0 g/dL    RDW 19.1 (H) 11.5 - 14.5 %    Platelets 363 150 - 450 K/uL    MPV 10.2 9.2 - 12.9 fL    Immature Granulocytes 0.2 0.0 - 0.5 %    Gran # (ANC) 7.0 1.8 - 7.7 K/uL    Immature Grans (Abs) 0.02 0.00 - 0.04 K/uL    Lymph # 2.0 1.0 - 4.8 K/uL    Mono # 0.6 0.3 - 1.0 K/uL    Eos # 0.1 0.0 - 0.5 K/uL     Baso # 0.02 0.00 - 0.20 K/uL    nRBC 0 0 /100 WBC    Gran % 71.9 38.0 - 73.0 %    Lymph % 20.2 18.0 - 48.0 %    Mono % 6.1 4.0 - 15.0 %    Eosinophil % 1.4 0.0 - 8.0 %    Basophil % 0.2 0.0 - 1.9 %    Differential Method Automated    Comprehensive metabolic panel   Result Value Ref Range    Sodium 142 136 - 145 mmol/L    Potassium 4.6 3.5 - 5.1 mmol/L    Chloride 110 95 - 110 mmol/L    CO2 22 (L) 23 - 29 mmol/L    Glucose 113 (H) 70 - 110 mg/dL    BUN 11 6 - 20 mg/dL    Creatinine 0.9 0.5 - 1.4 mg/dL    Calcium 9.0 8.7 - 10.5 mg/dL    Total Protein 7.6 6.0 - 8.4 g/dL    Albumin 3.7 3.5 - 5.2 g/dL    Total Bilirubin 0.1 0.1 - 1.0 mg/dL    Alkaline Phosphatase 93 55 - 135 U/L    AST 31 10 - 40 U/L    ALT 18 10 - 44 U/L    eGFR >60 >60 mL/min/1.73 m^2    Anion Gap 10 8 - 16 mmol/L   Protime-INR   Result Value Ref Range    Prothrombin Time 9.9 9.0 - 12.5 sec    INR 0.8 0.8 - 1.2   APTT   Result Value Ref Range    aPTT 25.7 21.0 - 32.0 sec        Imaging Results:  Imaging Results              US Lower Extremity Veins Right (Final result)  Result time 07/06/24 18:57:41      Final result by Uche Barrientos MD (07/06/24 18:57:41)                   Impression:      No evidence of deep venous thrombosis in the right lower extremity.      Electronically signed by: Uche Barrientos  Date:    07/06/2024  Time:    18:57               Narrative:    EXAMINATION:  US LOWER EXTREMITY VEINS RIGHT    CLINICAL HISTORY:  Pain in right leg    TECHNIQUE:  Duplex and color flow Doppler evaluation and graded compression of the right lower extremity veins was performed.    COMPARISON:  None    FINDINGS:  Right thigh veins: The common femoral, femoral, popliteal, upper greater saphenous, and deep femoral veins are patent and free of thrombus. The veins are normally compressible and have normal phasic flow and augmentation response.    Right calf veins: The visualized calf veins are patent.    Contralateral CFV: The contralateral (left) common  femoral vein is patent and free of thrombus.    Miscellaneous: None                                              The Emergency Provider reviewed the vital signs and test results, which are outlined above.     ED Discussion       8:35 PM: Reassessed pt at this time.  Discussed with pt all pertinent ED information and results. Discussed pt dx and plan of tx. Gave pt all f/u and return to the ED instructions. All questions and concerns were addressed at this time. Pt expresses understanding of information and instructions, and is comfortable with plan to discharge. Pt is stable for discharge.    I discussed with patient and/or family/caretaker that evaluation in the ED does not suggest any emergent or life threatening medical conditions requiring immediate intervention beyond what was provided in the ED, and I believe patient is safe for discharge.  Regardless, an unremarkable evaluation in the ED does not preclude the development or presence of a serious of life threatening condition. As such, patient was instructed to return immediately for any worsening or change in current symptoms.        Medical Decision Making  Differential diagnosis includes muscular injury, deep vein thrombosis, thrombophlebitis, superficial venous thrombosis, among others.    Patient presents to the emergency department due to concern for blood clot in her right lower extremity.  Patient currently has COVID-19, she knows that this is a potential hyper coagulant, so she comes in due to some right-sided calf pain.  She does exercise heavily, states that this could have caused it, but given her ongoing COVID she is concerned that she has a blood clot.  Workup here in the emergency department was negative for DVT with ultrasound.  No indication for any further workup at this time.  Lab work unremarkable.  We will have her discharged home to follow up with the primary care physician.    Amount and/or Complexity of Data Reviewed  Labs: ordered.  Decision-making details documented in ED Course.  Radiology: ordered. Decision-making details documented in ED Course.    Risk  OTC drugs.  Prescription drug management.                ED Medication(s):  Medications - No data to display    Discharge Medication List as of 7/6/2024  8:36 PM        START taking these medications    Details   ketorolac (TORADOL) 10 mg tablet Take 1 tablet (10 mg total) by mouth every 6 (six) hours. for 5 days, Starting Sat 7/6/2024, Until Thu 7/11/2024, Normal              Follow-up Information       Carol Sellers MD.    Specialty: Internal Medicine  Contact information:  [address] [place] [address]  [phone]                                 Scribe Attestation:   Scribe #1: I performed the above scribed service and the documentation accurately describes the services I performed. I attest to the accuracy of the note.     Attending:   Physician Attestation Statement for Scribe #1: I, Howard Yeboah MD, personally performed the services described in this documentation, as scribed by Carmen Carlson, in my presence, and it is both accurate and complete.       Scribe Attestation:   Scribe #1: I performed the above scribed service and the documentation accurately describes the services I performed. I attest to the accuracy of the note.         Clinical Impression       ICD-10-CM ICD-9-CM   1. Right leg pain  M79.604 729.5       Disposition:   Disposition: Discharged  Condition: Stable        Howard Yeboah MD  07/08/24 0338

## 2024-07-07 NOTE — DISCHARGE INSTRUCTIONS
Please follow-up with your PCP. Please call on the next business day to schedule this appointment.    Take the toradol as needed for pain. Please understand that all medications have potential side effects. Please read the package insert for all medications that we have prescribed/recommended. Please call your primary care physician or return to the ER with any questions or concerns you may have. Please take only the dosage that is prescribed.    This medication will not make you sleepy and it does not cause addiction. Please use as needed for pain and stop using it if you do not have pain.     Return with new/worsening symptoms, chest pain, shortness of breath, fevers/chills, nausea/vomiting, or any other concerns.

## 2024-07-11 ENCOUNTER — OFFICE VISIT (OUTPATIENT)
Dept: OTOLARYNGOLOGY | Facility: CLINIC | Age: 34
End: 2024-07-11
Payer: COMMERCIAL

## 2024-07-11 VITALS — WEIGHT: 199.06 LBS | BODY MASS INDEX: 31.18 KG/M2

## 2024-07-11 DIAGNOSIS — H69.93 ETD (EUSTACHIAN TUBE DYSFUNCTION), BILATERAL: ICD-10-CM

## 2024-07-11 DIAGNOSIS — J30.9 ALLERGIC RHINITIS, UNSPECIFIED SEASONALITY, UNSPECIFIED TRIGGER: Primary | ICD-10-CM

## 2024-07-11 PROCEDURE — 99999 PR PBB SHADOW E&M-EST. PATIENT-LVL III: CPT | Mod: PBBFAC,,, | Performed by: STUDENT IN AN ORGANIZED HEALTH CARE EDUCATION/TRAINING PROGRAM

## 2024-07-11 PROCEDURE — 1159F MED LIST DOCD IN RCRD: CPT | Mod: CPTII,S$GLB,, | Performed by: STUDENT IN AN ORGANIZED HEALTH CARE EDUCATION/TRAINING PROGRAM

## 2024-07-11 PROCEDURE — 3008F BODY MASS INDEX DOCD: CPT | Mod: CPTII,S$GLB,, | Performed by: STUDENT IN AN ORGANIZED HEALTH CARE EDUCATION/TRAINING PROGRAM

## 2024-07-11 PROCEDURE — 99203 OFFICE O/P NEW LOW 30 MIN: CPT | Mod: S$GLB,,, | Performed by: STUDENT IN AN ORGANIZED HEALTH CARE EDUCATION/TRAINING PROGRAM

## 2024-07-11 NOTE — PROGRESS NOTES
Chief complaint:   Chief Complaint   Patient presents with    Otalgia     L>R happens in the spring/fall           Referring Provider:  No referring provider defined for this encounter.    History of Present Illness:     Ms. Hernandez is a 34 y.o. female presenting for evaluation of let > right ear fullness. Happens every spring and fall.  Additional symptoms that also have been associated are post nasal drip, hives, sneezing. The patient denies muffled hearing, vertigo.  Treatment has included prednisone and ibuprofen with moderate relief. Occasional use of oral antihistamine.    Still a little fullness right now.      The patient endorse significant eustachian tube risk factors: ear pressure, ear pain, sensation of clogging, ear symptoms with a cold or sinusitis, popping or crackling sensation, ringing in the ear, and muffled hearing.     The patient denies significant hearing loss risk factors, ototoxic medication exposure, chronic vestibular suppressant use, head/ facial/ deysi trauma, and otologic surgery.      She did have allergy testing done about 12 years ago. Did have some non-seasonal allergies - molds, cockroaches, cats.    History        Past Medical History:   Past Medical History:   Diagnosis Date    Allergy     Anemia 3/13/2012 9:21:08 AM    [facility] - Hematologic: Anemia-No Additional Notes    GERD (gastroesophageal reflux disease)     Headache 3/13/2012 9:18:30 AM    [facility] - [facility]: Headache-No Additional Notes    Iron deficiency anemia     Sleep apnea     Vitamin D deficiency 7/5/2016 8:50:28 AM    [facility] Historical - Quick Add: Vitamin D deficiency-No Additional Notes    .          Past Surgical History:  Past Surgical History:   Procedure Laterality Date    COLONOSCOPY N/A 9/17/2015    Procedure: COLONOSCOPY;  Surgeon: Fred Baez MD;  Location: [facility];  Service: Endoscopy;  Laterality: N/A;    ESOPHAGOGASTRODUODENOSCOPY  2015     ESOPHAGOGASTRODUODENOSCOPY N/A 1/6/2023    Procedure: EGD (ESOPHAGOGASTRODUODENOSCOPY);  Surgeon: Shell Muñoz MD;  Location: [facility];  Service: Endoscopy;  Laterality: N/A;    PH MONITORING, ESOPHAGUS, WIRELESS, (OFF REFLUX MEDS) N/A 1/6/2023    Procedure: PH MONITORING, ESOPHAGUS, WIRELESS, (OFF REFLUX MEDS);  Surgeon: Shell Muñoz MD;  Location: [facility];  Service: Endoscopy;  Laterality: N/A;    WISDOM TOOTH EXTRACTION     .         Medications: Medication list was reviewed. She  has a current medication list which includes the following prescription(s): ammonium lactate, atomoxetine, finacea, buspirone, clindamycin, dexbrompheniramine-phenyleph, doxepin, escitalopram oxalate, famotidine, ketorolac, lisdexamfetamine, omeprazole, pantoprazole, paxlovid, and triamcinolone acetonide 0.1%, and the following Facility-Administered Medications: lactated ringers.         Allergies: Review of patient's allergies indicates:  No Known Allergies         Family history: family history is not on file.         Social History          Alcohol use:  reports current alcohol use.            Tobacco:  reports that she has never smoked. She has never used smokeless tobacco.         Please see the patient's intake form for full details of past medical history, past surgical history, family history, social history and review of systems. ?This information was reviewed by me and noted.      Physical Examination     General: Well developed, well nourished, well hydrated. Verbal with a strong voice and not dysphonic.     Head/Face: Normocephalic, atraumatic. No scars or lesions. Facial musculature equal.     Eyes: No scleral icterus or conjunctival hemorrhage. EOMI. PERRLA.     Ears:     Right ear: No gross deformity. EAC is clear of debris and erythema. The TM is intact with a pneumatized middle ear. No signs of retraction, fluid or infection.      Left ear: No gross deformity. EAC is clear of debris and erythema. The TM  is intact with a pneumatized middle ear. No signs of retraction, fluid or infection.     Hearing: grossly intact    Nose: No gross deformity or lesions. No purulent discharge. No significant NSD.      Mouth/Oropharynx: Lips without any lesions. No mucosal lesions within the oropharynx. No tonsillar exudate or lesions. Pharyngeal walls symmetrical. Uvula midline. Tongue midline without lesions.     Neck: Trachea midline. No masses. No thyromegaly or nodules palpated.     Lymphatic: No lymphadenopathy in the neck.     Extremities: No cyanosis. Warm and well-perfused.     Skin: No scars or lesions on face or neck.      Neurologic: Moving all extremities without gross abnormality.CN II-XII grossly intact. House-Brackmann 1/6. No signs of nystagmus.      Psych: Alert and oriented to person, place, and time with an appropriate mood and affect.     Data review:    Review of records:      I reviewed records from the referring provider's office visits.  These describe the history, workup, and/or treatment of this problem thus far.           Assessment/Plan:      1. Allergic rhinitis, unspecified seasonality, unspecified trigger    2. ETD (Eustachian tube dysfunction), bilateral       Audio when symptoms bothersome if not improved with consistent use of saline irrigations, flonase, oral antihistamine   Consider allergy testing if insufficient improvement         Arik Sanchez MD  [facility] Department of Otolaryngology   [facility] [address]  [place] [id number]  P: [phone]  F: [phone]

## 2024-08-05 ENCOUNTER — HOSPITAL ENCOUNTER (OUTPATIENT)
Dept: RADIOLOGY | Facility: HOSPITAL | Age: 34
Discharge: HOME | End: 2024-08-05
Attending: ORTHOPAEDIC SURGERY
Payer: COMMERCIAL

## 2024-08-05 ENCOUNTER — OFFICE VISIT (OUTPATIENT)
Dept: SPORTS MEDICINE | Facility: CLINIC | Age: 34
End: 2024-08-05
Payer: COMMERCIAL

## 2024-08-05 VITALS — WEIGHT: 199.06 LBS | BODY MASS INDEX: 31.24 KG/M2 | HEIGHT: 67 IN

## 2024-08-05 DIAGNOSIS — M25.562 PAIN IN BOTH KNEES, UNSPECIFIED CHRONICITY: ICD-10-CM

## 2024-08-05 DIAGNOSIS — M25.561 PAIN IN BOTH KNEES, UNSPECIFIED CHRONICITY: ICD-10-CM

## 2024-08-05 DIAGNOSIS — M22.2X2 PATELLOFEMORAL PAIN SYNDROME OF BOTH KNEES: Primary | ICD-10-CM

## 2024-08-05 DIAGNOSIS — M22.2X1 PATELLOFEMORAL PAIN SYNDROME OF BOTH KNEES: Primary | ICD-10-CM

## 2024-08-05 PROCEDURE — 73564 X-RAY EXAM KNEE 4 OR MORE: CPT | Mod: 26,,, | Performed by: RADIOLOGY

## 2024-08-05 PROCEDURE — 3008F BODY MASS INDEX DOCD: CPT | Mod: CPTII,S$GLB,, | Performed by: ORTHOPAEDIC SURGERY

## 2024-08-05 PROCEDURE — 99999 PR PBB SHADOW E&M-EST. PATIENT-LVL IV: CPT | Mod: PBBFAC,,, | Performed by: ORTHOPAEDIC SURGERY

## 2024-08-05 PROCEDURE — 73564 X-RAY EXAM KNEE 4 OR MORE: CPT | Mod: TC,50,PN

## 2024-08-05 PROCEDURE — 1159F MED LIST DOCD IN RCRD: CPT | Mod: CPTII,S$GLB,, | Performed by: ORTHOPAEDIC SURGERY

## 2024-08-05 PROCEDURE — 99203 OFFICE O/P NEW LOW 30 MIN: CPT | Mod: S$GLB,,, | Performed by: ORTHOPAEDIC SURGERY

## 2024-08-07 ENCOUNTER — CLINICAL SUPPORT (OUTPATIENT)
Facility: HOSPITAL | Age: 34
End: 2024-08-07
Attending: ORTHOPAEDIC SURGERY
Payer: COMMERCIAL

## 2024-08-07 DIAGNOSIS — G89.29 CHRONIC PAIN OF BOTH KNEES: Primary | ICD-10-CM

## 2024-08-07 DIAGNOSIS — M25.561 CHRONIC PAIN OF BOTH KNEES: Primary | ICD-10-CM

## 2024-08-07 DIAGNOSIS — M22.2X1 PATELLOFEMORAL PAIN SYNDROME OF BOTH KNEES: ICD-10-CM

## 2024-08-07 DIAGNOSIS — M22.2X2 PATELLOFEMORAL PAIN SYNDROME OF BOTH KNEES: ICD-10-CM

## 2024-08-07 DIAGNOSIS — M62.559 MUSCLE WASTING AND ATROPHY, NOT ELSEWHERE CLASSIFIED, UNSPECIFIED THIGH: ICD-10-CM

## 2024-08-07 DIAGNOSIS — M25.562 CHRONIC PAIN OF BOTH KNEES: Primary | ICD-10-CM

## 2024-08-07 PROCEDURE — 97110 THERAPEUTIC EXERCISES: CPT | Mod: PN

## 2024-08-07 PROCEDURE — 97161 PT EVAL LOW COMPLEX 20 MIN: CPT | Mod: PN

## 2024-08-14 ENCOUNTER — CLINICAL SUPPORT (OUTPATIENT)
Facility: HOSPITAL | Age: 34
End: 2024-08-14
Payer: COMMERCIAL

## 2024-08-14 DIAGNOSIS — M25.561 CHRONIC PAIN OF BOTH KNEES: Primary | ICD-10-CM

## 2024-08-14 DIAGNOSIS — G89.29 CHRONIC PAIN OF BOTH KNEES: Primary | ICD-10-CM

## 2024-08-14 DIAGNOSIS — M25.562 CHRONIC PAIN OF BOTH KNEES: Primary | ICD-10-CM

## 2024-08-14 DIAGNOSIS — M62.559 MUSCLE WASTING AND ATROPHY, NOT ELSEWHERE CLASSIFIED, UNSPECIFIED THIGH: ICD-10-CM

## 2024-08-14 PROCEDURE — 97112 NEUROMUSCULAR REEDUCATION: CPT | Mod: PN

## 2024-08-14 PROCEDURE — 97530 THERAPEUTIC ACTIVITIES: CPT | Mod: PN

## 2024-08-14 PROCEDURE — 97140 MANUAL THERAPY 1/> REGIONS: CPT | Mod: PN

## 2024-08-14 PROCEDURE — 97110 THERAPEUTIC EXERCISES: CPT | Mod: PN

## 2024-08-14 NOTE — PROGRESS NOTES
"[facility]   Physical Therapy Treatment Note     Name: Beth Mathew Hernandez  Clinic Number: [id number]    Therapy Diagnosis:   Encounter Diagnoses   Name Primary?    Chronic pain of both knees Yes    Muscle wasting and atrophy, not elsewhere classified, unspecified thigh      Physician: Donald Lockhart MD    Visit Date: 8/14/2024    Physician Orders: PT Eval and Treat  Medical Diagnosis from Referral: Patellofemoral pain Syndrome of Both Knees   Evaluation Date: 8/7/2024  Authorization Period Expiration: 12/31/24  Plan of Care Expiration: 10/30/24  Visit # / Visits authorized: 1/20 (+1 for evaluation)  FOTO: 1/3    Progress Note Due: 9/7/24    Precautions: Standard    Time In: 1:35 pm  Time Out: 2:35 pm   Total Billable Time: 60 minutes    SUBJECTIVE     Pt reports: no knee pain currently but she did experience mild knee pain while lifting things from the ground earlier today.  She was compliant with home exercise program.  Response to previous treatment: demonstrated understanding of HEP.   Functional change: independent with HEP.    Pain:  Current 0/10, worst 9/10  Location: bilateral anterior knees    OBJECTIVE     Objective Measures updated at progress report unless specified.     Treatment     Beth received the treatments listed below:      Intervention Code  (see below chart) Performed   Today Date/Notes  8/14/24   Astym MT X Didier ant knees   Upright Bike TE X 5'   Calf Stretch TE X Wedge: 30"x3   Hamstring Stretch TE X Seated: 30"x3   Prone Quad Stretch TE X Strap: 30"x3   Clamshells  NMR X 3x10 - didier   SLR - Abd NMR X 3x10 - didier   Assisted Squats TA X Orange sportscord, 2x10  (mild pain)   SLS NMR X 30"x2 - didier                       10 minutes of Manual therapy (MT) to improve pain and ROM.  30 minutes of Therapeutic Exercise (TE) to develop strength, endurance, ROM, and flexibility.  10 minutes of Neuromuscular Re-Education (NMR)  to improve: Balance, Coordination, " Kinesthetic, Sense, Proprioception, and Posture.  10 minutes of Therapeutic Activities (TA) to improve functional performance.        Patient Education and Home Exercises     Home Exercises Provided and Patient Education Provided     Education provided:   - reviewed for understanding.    Written Home Exercises Provided: Patient instructed to cont prior HEP. Exercises were reviewed and Beth was able to demonstrate them prior to the end of the session.  Beth demonstrated good  understanding of the education provided. See EMR under Patient Instructions for exercises provided during therapy sessions    ASSESSMENT   Pt reported decreased knee pain with squatting after MT. Added Upright Bike, Calf Stretch, Hamstring Stretch, and Prone Quad Stretch during TE to improve LE soft tissue mobility/ROM. Increased sets of Clamshells and SLR - Abd during NMR to improve lateral hip motor control. Added Assisted Squats and Step Downs during TA to improve pt's ability to squat and negotiate stairs for ADL's.         Beth Is progressing well towards her goals.   Pt prognosis is Excellent.     Pt will continue to benefit from skilled outpatient physical therapy to address the deficits listed in the problem list box on initial evaluation, provide pt/family education and to maximize pt's level of independence in the home and community environment.     Pt's spiritual, cultural and educational needs considered and pt agreeable to plan of care and goals.     Anticipated barriers to physical therapy: none    GOALS:    Short Term Goals:  6 weeks Progress      Patient will report decreased pain to <6/10 at worst on VAS to progress toward Prior Level of Function. Progressing   Patient will report improved function by score of >80 out of 100 on FOTO.    Patient will improve AROM to 50% of stated goals in order to progress towards Prior Level of Function.    Patient will improve strength to 50% of stated goals in order to progress towards  Prior Level of Function.      Long Term Goals:  12 weeks Progress     Patient will report decreased pain to <3/10 at worst on VAS to progress toward Prior Level of Function.      Patient will report improved function by a score of >90 out of 100 on FOTO.    Patient will improve ROM and Functional Mobility to stated goals to return to Prior Level of Function.    Patient will improve strength to stated goals in order to return to Prior Level of Function.        PLAN   Continue Plan of Care (POC) and progress per patient tolerance. See treatment section for details on planned progressions next session.      Felix Darby, PT, DPT, SCS

## 2024-08-21 ENCOUNTER — CLINICAL SUPPORT (OUTPATIENT)
Facility: HOSPITAL | Age: 34
End: 2024-08-21
Attending: ORTHOPAEDIC SURGERY
Payer: COMMERCIAL

## 2024-08-21 DIAGNOSIS — M62.559 MUSCLE WASTING AND ATROPHY, NOT ELSEWHERE CLASSIFIED, UNSPECIFIED THIGH: ICD-10-CM

## 2024-08-21 DIAGNOSIS — G89.29 CHRONIC PAIN OF BOTH KNEES: Primary | ICD-10-CM

## 2024-08-21 DIAGNOSIS — M25.562 CHRONIC PAIN OF BOTH KNEES: Primary | ICD-10-CM

## 2024-08-21 DIAGNOSIS — M25.561 CHRONIC PAIN OF BOTH KNEES: Primary | ICD-10-CM

## 2024-08-21 PROCEDURE — 97140 MANUAL THERAPY 1/> REGIONS: CPT | Mod: PN

## 2024-08-21 PROCEDURE — 97110 THERAPEUTIC EXERCISES: CPT | Mod: PN

## 2024-08-21 PROCEDURE — 97112 NEUROMUSCULAR REEDUCATION: CPT | Mod: PN

## 2024-08-21 NOTE — PROGRESS NOTES
"[facility]   Physical Therapy Treatment Note     Name: Beth Mathew [place]  Clinic Number: [id number]    Therapy Diagnosis:   Encounter Diagnoses   Name Primary?    Chronic pain of both knees Yes    Muscle wasting and atrophy, not elsewhere classified, unspecified thigh      Physician: Donald Lcokhart MD    Visit Date: 8/21/2024    Physician Orders: PT Eval and Treat  Medical Diagnosis from Referral: Patellofemoral pain Syndrome of Both Knees   Evaluation Date: 8/7/2024  Authorization Period Expiration: 12/31/24  Plan of Care Expiration: 10/30/24  Visit # / Visits authorized: 2/20 (+1 for evaluation)  FOTO: 1/3    Progress Note Due: 9/7/24    Precautions: Standard    Time In: 1:45 pm  Time Out: 2:55 pm   Total Billable Time: 70 minutes    SUBJECTIVE     Pt reports: she feels that the frequency of her symptoms is increasing.   She was compliant with home exercise program.  Response to previous treatment: patient reported decreased knee pain after Astym.   Functional change: independent with HEP.    Pain:  Current 0/10, worst 9/10  Location: bilateral anterior knees    OBJECTIVE     Objective Measures updated at progress report unless specified.     Treatment     Beth received the treatments listed below:      Intervention Code  (see below chart) Performed   Today Date/Notes  8/21/24   Astym MT X Didier ant knees   Upright Bike TE X 5'   Calf Stretch TE   Wedge: 30"x3   Hamstring Stretch TE   Seated: 30"x3   Prone Quad Stretch TE   Strap: 30"x3   Abdominal Bracing NMR X 3" hold x 3'   Bracing w/ Arms NMR X 3'   Bracing w/ Legs NMR X 3'   Clamshells Iso Hold NMR X 1' x 3 - didier   SLR - Abd Iso Hold  NMR X 30" x 3 - didier   Assisted Squats TE X Orange sportscord, 2 pads, 3x10  (mild pain)   SLS NMR   30"x2 - didier                       15 minutes of Manual therapy (MT) to improve pain and ROM.  15 minutes of Therapeutic Exercise (TE) to develop strength, endurance, ROM, and flexibility.  25 " minutes of Neuromuscular Re-Education (NMR)  to improve: Balance, Coordination, Kinesthetic, Sense, Proprioception, and Posture.        Patient Education and Home Exercises     Home Exercises Provided and Patient Education Provided     Education provided:   - reviewed for understanding.    Written Home Exercises Provided: Patient instructed to cont prior HEP. Exercises were reviewed and Beth was able to demonstrate them prior to the end of the session.  Beth demonstrated good  understanding of the education provided. See EMR under Patient Instructions for exercises provided during therapy sessions    ASSESSMENT   Assessed L hip mobility and core stability during TE prior to today's treatment. Noted L hip adductor tightness and instability of lumbar spine. Added Abdominal Bracing, Bracing with Arms, and Bracing with Legs during NMR to improve core motor control. Increased sets of Assisted Squats during TE to improve functional mobility. Pt reported decreased pain with squatting after MT.         Beth Is progressing well towards her goals.   Pt prognosis is Excellent.     Pt will continue to benefit from skilled outpatient physical therapy to address the deficits listed in the problem list box on initial evaluation, provide pt/family education and to maximize pt's level of independence in the home and community environment.     Pt's spiritual, cultural and educational needs considered and pt agreeable to plan of care and goals.     Anticipated barriers to physical therapy: none    GOALS:    Short Term Goals:  6 weeks Progress      Patient will report decreased pain to <6/10 at worst on VAS to progress toward Prior Level of Function. Progressing   Patient will report improved function by score of >80 out of 100 on FOTO.    Patient will improve AROM to 50% of stated goals in order to progress towards Prior Level of Function.    Patient will improve strength to 50% of stated goals in order to progress towards Prior  Level of Function.      Long Term Goals:  12 weeks Progress     Patient will report decreased pain to <3/10 at worst on VAS to progress toward Prior Level of Function.      Patient will report improved function by a score of >90 out of 100 on FOTO.    Patient will improve ROM and Functional Mobility to stated goals to return to Prior Level of Function.    Patient will improve strength to stated goals in order to return to Prior Level of Function.        PLAN   Continue Plan of Care (POC) and progress per patient tolerance. See treatment section for details on planned progressions next session.      Felix Darby, PT, DPT, SCS

## 2024-08-27 ENCOUNTER — CLINICAL SUPPORT (OUTPATIENT)
Facility: HOSPITAL | Age: 34
End: 2024-08-27
Payer: COMMERCIAL

## 2024-08-27 DIAGNOSIS — G89.29 CHRONIC PAIN OF BOTH KNEES: Primary | ICD-10-CM

## 2024-08-27 DIAGNOSIS — M25.561 CHRONIC PAIN OF BOTH KNEES: Primary | ICD-10-CM

## 2024-08-27 DIAGNOSIS — M25.562 CHRONIC PAIN OF BOTH KNEES: Primary | ICD-10-CM

## 2024-08-27 DIAGNOSIS — M62.559 MUSCLE WASTING AND ATROPHY, NOT ELSEWHERE CLASSIFIED, UNSPECIFIED THIGH: ICD-10-CM

## 2024-08-27 PROCEDURE — 97110 THERAPEUTIC EXERCISES: CPT | Mod: PN

## 2024-08-27 PROCEDURE — 97140 MANUAL THERAPY 1/> REGIONS: CPT | Mod: PN

## 2024-08-27 PROCEDURE — 97112 NEUROMUSCULAR REEDUCATION: CPT | Mod: PN

## 2024-08-27 NOTE — PROGRESS NOTES
"[facility]   Physical Therapy Treatment Note     Name: Beth Mathew [place]  Clinic Number: [id number]    Therapy Diagnosis:   Encounter Diagnoses   Name Primary?    Chronic pain of both knees Yes    Muscle wasting and atrophy, not elsewhere classified, unspecified thigh      Physician: Donald Lockhart MD    Visit Date: 8/27/2024    Physician Orders: PT Eval and Treat  Medical Diagnosis from Referral: Patellofemoral pain Syndrome of Both Knees   Evaluation Date: 8/7/2024  Authorization Period Expiration: 12/31/24  Plan of Care Expiration: 10/30/24  Visit # / Visits authorized: 3/20 (+1 for evaluation)  FOTO: 1/3    Progress Note Due: 9/7/24    Precautions: Standard    Time In: 9:30 am  Time Out: 10:30 am  Total Billable Time: 60 minutes    SUBJECTIVE     Pt reports: no R knee pain, mild L knee pain.   She was compliant with home exercise program.  Response to previous treatment: patient reported decreased knee pain.    Functional change: patient denies pain with her current ADL's; however, patient states she is avoiding painful ADL's at this time.     Pain:  Current 0/10, worst 9/10  Location: bilateral anterior knees    OBJECTIVE     Objective Measures updated at progress report unless specified.     Treatment     Beth received the treatments listed below:      Intervention Code  (see below chart) Performed   Today Date/Notes  8/26/24   Astym MT X Didier ant knees   Upright Bike TE X 5'   Wall Sit Isometric NMR X 45" hold x 5 with 2' rest   Calf Stretch TE   Wedge: 30"x3   Hamstring Stretch TE   Seated: 30"x3   Prone Quad Stretch TE   Strap: 30"x3   Bracing w/ Arms and Legs NMR X 3'   Clamshells Iso Hold NMR X 1' x 3 - didier   SLR - Abd Iso Hold  NMR X 30" x 3 - didier   Assisted Squats TE X Orange sportscord, 2 pads, 3x10  (mild pain)   Step Ups TE X 4" box, 2x10 - didier   SLS NMR   30"x2 - didier   15 minutes of Manual therapy (MT) to improve pain and ROM.  15 minutes of Therapeutic Exercise " (TE) to develop strength, endurance, ROM, and flexibility.  25 minutes of Neuromuscular Re-Education (NMR)  to improve: Balance, Coordination, Kinesthetic, Sense, Proprioception, and Posture.        Patient Education and Home Exercises     Home Exercises Provided and Patient Education Provided     Education provided:   - reviewed for understanding.    Written Home Exercises Provided: Patient instructed to cont prior HEP. Exercises were reviewed and Beth was able to demonstrate them prior to the end of the session.  Beth demonstrated good  understanding of the education provided. See EMR under Patient Instructions for exercises provided during therapy sessions    ASSESSMENT   Performed Astym during MT to promote soft tissue healing. Added Wall Sit Isometric during NMR to improve quad motor control and alleviate patellar tendon pain. Added Step Ups during TE to improve LE strength and functional mobility.         Beth Is progressing well towards her goals.   Pt prognosis is Excellent.     Pt will continue to benefit from skilled outpatient physical therapy to address the deficits listed in the problem list box on initial evaluation, provide pt/family education and to maximize pt's level of independence in the home and community environment.     Pt's spiritual, cultural and educational needs considered and pt agreeable to plan of care and goals.     Anticipated barriers to physical therapy: none    GOALS:    Short Term Goals:  6 weeks Progress      Patient will report decreased pain to <6/10 at worst on VAS to progress toward Prior Level of Function. Progressing   Patient will report improved function by score of >80 out of 100 on FOTO.    Patient will improve AROM to 50% of stated goals in order to progress towards Prior Level of Function.    Patient will improve strength to 50% of stated goals in order to progress towards Prior Level of Function.      Long Term Goals:  12 weeks Progress     Patient will report  decreased pain to <3/10 at worst on VAS to progress toward Prior Level of Function.      Patient will report improved function by a score of >90 out of 100 on FOTO.    Patient will improve ROM and Functional Mobility to stated goals to return to Prior Level of Function.    Patient will improve strength to stated goals in order to return to Prior Level of Function.        PLAN   Continue Plan of Care (POC) and progress per patient tolerance. See treatment section for details on planned progressions next session.      Felix Darby, PT, DPT, SCS

## 2024-08-28 ENCOUNTER — CLINICAL SUPPORT (OUTPATIENT)
Facility: HOSPITAL | Age: 34
End: 2024-08-28
Payer: COMMERCIAL

## 2024-08-28 DIAGNOSIS — G89.29 CHRONIC PAIN OF BOTH KNEES: Primary | ICD-10-CM

## 2024-08-28 DIAGNOSIS — M25.562 CHRONIC PAIN OF BOTH KNEES: Primary | ICD-10-CM

## 2024-08-28 DIAGNOSIS — M62.559 MUSCLE WASTING AND ATROPHY, NOT ELSEWHERE CLASSIFIED, UNSPECIFIED THIGH: ICD-10-CM

## 2024-08-28 DIAGNOSIS — M25.561 CHRONIC PAIN OF BOTH KNEES: Primary | ICD-10-CM

## 2024-08-28 PROCEDURE — 97112 NEUROMUSCULAR REEDUCATION: CPT | Mod: PN

## 2024-08-28 PROCEDURE — 97110 THERAPEUTIC EXERCISES: CPT | Mod: PN

## 2024-08-28 NOTE — PROGRESS NOTES
"[facility]   Physical Therapy Treatment Note     Name: Beth Hernandez  Clinic Number: [id number]    Therapy Diagnosis:   Encounter Diagnoses   Name Primary?    Chronic pain of both knees Yes    Muscle wasting and atrophy, not elsewhere classified, unspecified thigh      Physician: Donald Lockhart MD    Visit Date: 8/28/2024    Physician Orders: PT Eval and Treat  Medical Diagnosis from Referral: Patellofemoral pain Syndrome of Both Knees   Evaluation Date: 8/7/2024  Authorization Period Expiration: 12/31/24  Plan of Care Expiration: 10/30/24  Visit # / Visits authorized: 4/20 (+1 for evaluation)  FOTO: 1/3    Progress Note Due: 9/7/24    Precautions: Standard    Time In: 2:00  Time Out: 2:56  Total Billable Time: 56 minutes    SUBJECTIVE     Pt reports: no pain currently  Response to previous treatment: tolerated well  Functional change: decreased intensity of pain    Pain:  Current 0/10, worst 9/10  Location: bilateral anterior knees    OBJECTIVE     Objective Measures updated at progress report unless specified.     Treatment     Beth received the treatments listed below:      Intervention Code  (see below chart) Performed   Today Date/Notes  8/28/24   Astym MT  Didier ant knees   Upright Bike TE X 5'   Calf Stretch TE X Wedge: 30"x3   Hamstring Stretch TE X Seated: 30"x3   Prone Quad Stretch TE X Strap: 30"x3   Wall Sit Isometric NMR X 45" hold x 5 with 2' rest   Clamshells Iso Hold NMR X Yellow loop, 1' x 3 - didier   SLR - Abd Iso Hold  NMR X 30" x 3 - didier   Assisted Squats TE X Orange sportscord, 2 pads, 3x10  (mild pain)   Step Ups TE X 4" box, 3x10 - didier   SLS NMR   30"x3 - didier   0 minutes of Manual therapy (MT) to improve pain and ROM.  25 minutes of Therapeutic Exercise (TE) to develop strength, endurance, ROM, and flexibility.  25 minutes of Neuromuscular Re-Education (NMR)  to improve: Balance, Coordination, Kinesthetic, Sense, Proprioception, and Posture.        Patient " Education and Home Exercises     Home Exercises Provided and Patient Education Provided     Education provided:   - reviewed for understanding.    Written Home Exercises Provided: Patient instructed to cont prior HEP. Exercises were reviewed and Beth was able to demonstrate them prior to the end of the session.  Beth demonstrated good  understanding of the education provided. See EMR under Patient Instructions for exercises provided during therapy sessions    ASSESSMENT   Pt reported decreased pain with Assisted Squats today. Added resistance to Clamshell Iso during NMR to improve motor control. Resumed stretches during TE to promote soft tissue healing.         Beth Is progressing well towards her goals.   Pt prognosis is Excellent.     Pt will continue to benefit from skilled outpatient physical therapy to address the deficits listed in the problem list box on initial evaluation, provide pt/family education and to maximize pt's level of independence in the home and community environment.     Pt's spiritual, cultural and educational needs considered and pt agreeable to plan of care and goals.     Anticipated barriers to physical therapy: none    GOALS:    Short Term Goals:  6 weeks Progress      Patient will report decreased pain to <6/10 at worst on VAS to progress toward Prior Level of Function. Progressing   Patient will report improved function by score of >80 out of 100 on FOTO.    Patient will improve AROM to 50% of stated goals in order to progress towards Prior Level of Function.    Patient will improve strength to 50% of stated goals in order to progress towards Prior Level of Function.      Long Term Goals:  12 weeks Progress     Patient will report decreased pain to <3/10 at worst on VAS to progress toward Prior Level of Function.      Patient will report improved function by a score of >90 out of 100 on FOTO.    Patient will improve ROM and Functional Mobility to stated goals to return to Prior  Level of Function.    Patient will improve strength to stated goals in order to return to Prior Level of Function.        PLAN   Continue Plan of Care (POC) and progress per patient tolerance. See treatment section for details on planned progressions next session.      Felix Darby, PT, DPT, SCS

## 2024-09-03 ENCOUNTER — CLINICAL SUPPORT (OUTPATIENT)
Facility: HOSPITAL | Age: 34
End: 2024-09-03
Payer: COMMERCIAL

## 2024-09-03 DIAGNOSIS — G89.29 CHRONIC PAIN OF BOTH KNEES: Primary | ICD-10-CM

## 2024-09-03 DIAGNOSIS — M62.559 MUSCLE WASTING AND ATROPHY, NOT ELSEWHERE CLASSIFIED, UNSPECIFIED THIGH: ICD-10-CM

## 2024-09-03 DIAGNOSIS — M25.562 CHRONIC PAIN OF BOTH KNEES: Primary | ICD-10-CM

## 2024-09-03 DIAGNOSIS — M25.561 CHRONIC PAIN OF BOTH KNEES: Primary | ICD-10-CM

## 2024-09-03 PROCEDURE — 97110 THERAPEUTIC EXERCISES: CPT | Mod: PN

## 2024-09-03 PROCEDURE — 97112 NEUROMUSCULAR REEDUCATION: CPT | Mod: PN

## 2024-09-03 PROCEDURE — 97530 THERAPEUTIC ACTIVITIES: CPT | Mod: PN

## 2024-09-03 NOTE — PROGRESS NOTES
"[facility]   Physical Therapy Treatment Note     Name: Beth Hernandez  Clinic Number: [id number]    Therapy Diagnosis:   Encounter Diagnoses   Name Primary?    Chronic pain of both knees Yes    Muscle wasting and atrophy, not elsewhere classified, unspecified thigh      Physician: Donald Lockhart MD    Visit Date: 9/3/2024    Physician Orders: PT Eval and Treat  Medical Diagnosis from Referral: Patellofemoral pain Syndrome of Both Knees   Evaluation Date: 8/7/2024  Authorization Period Expiration: 12/31/24  Plan of Care Expiration: 10/30/24  Visit # / Visits authorized: 5/20 (+1 for evaluation)  FOTO: 1/3    Progress Note Due: 9/7/24    Precautions: Standard    Time In: 9:50 am   Time Out: 10:40 am  Total Billable Time: 50 minutes    SUBJECTIVE     Pt reports: no pain currently  Response to previous treatment: tolerated well  Functional change: decreased intensity of pain    Pain:  Current 0/10, worst 9/10  Location: bilateral anterior knees    OBJECTIVE     Objective Measures updated at progress report unless specified.     Treatment     Beth received the treatments listed below:      Intervention Code  (see below chart) Performed   Today Date/Notes  9/3/24   Astym MT   Didier ant knees   Upright Bike TE X 5'   Calf Stretch TE X Wedge: 30"x3   Hamstring Stretch TE X Seated: 30"x3   Prone Quad Stretch TE X Strap: 30"x3   Wall Sit Isometric NMR X 45" hold x 5 with 2' rest   Clamshells Iso Hold NMR X Yellow loop, 1' x 3 - didier   SLR - Abd Iso Hold  NMR X 30" x 3 - didier   Lateral Squat Walk NME X Green loop, 5 yd x 2 laps   Assisted Squats TA X Orange sportscord, 1 pad, 3x10  (No pain, mainly discomfort)   Step Ups TA X 6" box, 3x10 - didier (Discomfort)    SLS NMR   30"x3 - didier   0 minutes of Manual therapy (MT) to improve pain and ROM.  15 minutes of Therapeutic Exercise (TE) to develop strength, endurance, ROM, and flexibility.  25 minutes of Neuromuscular Re-Education (NMR)  to " improve: Balance, Coordination, Kinesthetic, Sense, Proprioception, and Posture.  10 minutes of Therapeutic Activities (TA) to improve functional performance.        Patient Education and Home Exercises     Home Exercises Provided and Patient Education Provided     Education provided:   - reviewed for understanding.    Written Home Exercises Provided: Patient instructed to cont prior HEP. Exercises were reviewed and Beth was able to demonstrate them prior to the end of the session.  Beth demonstrated good  understanding of the education provided. See EMR under Patient Instructions for exercises provided during therapy sessions    ASSESSMENT   Added lateral squat walk during NMR to improve lateral hip motor control. Increased squat depth during assisted squats and box height on Step Ups during TA to improve pt's ability to squat and use stairs.          Beth Is progressing well towards her goals.   Pt prognosis is Excellent.     Pt will continue to benefit from skilled outpatient physical therapy to address the deficits listed in the problem list box on initial evaluation, provide pt/family education and to maximize pt's level of independence in the home and community environment.     Pt's spiritual, cultural and educational needs considered and pt agreeable to plan of care and goals.     Anticipated barriers to physical therapy: none    GOALS:    Short Term Goals:  6 weeks Progress      Patient will report decreased pain to <6/10 at worst on VAS to progress toward Prior Level of Function. Progressing   Patient will report improved function by score of >80 out of 100 on FOTO.    Patient will improve AROM to 50% of stated goals in order to progress towards Prior Level of Function.    Patient will improve strength to 50% of stated goals in order to progress towards Prior Level of Function.      Long Term Goals:  12 weeks Progress     Patient will report decreased pain to <3/10 at worst on VAS to progress toward  Prior Level of Function.      Patient will report improved function by a score of >90 out of 100 on FOTO.    Patient will improve ROM and Functional Mobility to stated goals to return to Prior Level of Function.    Patient will improve strength to stated goals in order to return to Prior Level of Function.        PLAN   Continue Plan of Care (POC) and progress per patient tolerance. See treatment section for details on planned progressions next session.      Felix Darby, PT, DPT, SCS

## 2024-09-04 ENCOUNTER — CLINICAL SUPPORT (OUTPATIENT)
Facility: HOSPITAL | Age: 34
End: 2024-09-04
Payer: COMMERCIAL

## 2024-09-04 DIAGNOSIS — M62.559 MUSCLE WASTING AND ATROPHY, NOT ELSEWHERE CLASSIFIED, UNSPECIFIED THIGH: ICD-10-CM

## 2024-09-04 DIAGNOSIS — M25.562 CHRONIC PAIN OF BOTH KNEES: Primary | ICD-10-CM

## 2024-09-04 DIAGNOSIS — M25.561 CHRONIC PAIN OF BOTH KNEES: Primary | ICD-10-CM

## 2024-09-04 DIAGNOSIS — G89.29 CHRONIC PAIN OF BOTH KNEES: Primary | ICD-10-CM

## 2024-09-04 PROCEDURE — 97530 THERAPEUTIC ACTIVITIES: CPT | Mod: PN

## 2024-09-04 PROCEDURE — 97112 NEUROMUSCULAR REEDUCATION: CPT | Mod: PN

## 2024-09-04 PROCEDURE — 97110 THERAPEUTIC EXERCISES: CPT | Mod: PN

## 2024-09-04 NOTE — PROGRESS NOTES
"[facility]   Physical Therapy Treatment Note     Name: Beth Hernandez  Clinic Number: [id number]    Therapy Diagnosis:   Encounter Diagnoses   Name Primary?    Chronic pain of both knees Yes    Muscle wasting and atrophy, not elsewhere classified, unspecified thigh      Physician: Donald Lockhart MD    Visit Date: 9/4/2024    Physician Orders: PT Eval and Treat  Medical Diagnosis from Referral: Patellofemoral pain Syndrome of Both Knees   Evaluation Date: 8/7/2024  Authorization Period Expiration: 12/31/24  Plan of Care Expiration: 10/30/24  Visit # / Visits authorized: 6/20 (+1 for evaluation)  FOTO: 1/3    Progress Note Due: 9/7/24    Precautions: Standard    Time In: 2:05 pm   Time Out: 3:06 pm   Total Billable Time: 61 minutes    SUBJECTIVE     Pt reports: no knee pain today.   Response to previous treatment: pt reports experiencing fatigue with exercises but no pain.  Functional change: decreased intensity of pain    Pain:  Current 0/10, worst 9/10  Location: bilateral anterior knees    OBJECTIVE     Objective Measures updated at progress report unless specified.     Treatment     Beth received the treatments listed below:      Intervention Code  (see below chart) Performed   Today Date/Notes  9/4/24   Astym MT   Didier ant knees   Upright Bike TE X 5'   Calf Stretch TE X Wedge: 30"x3   Hamstring Stretch TE X Seated: 30"x3   Prone Quad Stretch TE X By hand: 30"x3   Wall Sit Isometric NMR X 45" hold x 5 with 2' rest   Clamshells Iso Hold NMR X Yellow loop, 1' x 3 - didier   SLR - Abd Iso Hold  NMR X 45" x 3 - didier   Lateral Squat Walk NME X Green loop, 5 yd x 3 laps   Assisted Squats TA X Orange sportscord, 1 pad, 3x10   Step Ups TA X 8" box, 3x10 - didier    SLS NMR X Airex, 30"x3 - didier                0 minutes of Manual therapy (MT) to improve pain and ROM.  15 minutes of Therapeutic Exercise (TE) to develop strength, endurance, ROM, and flexibility.  30 minutes of Neuromuscular " Re-Education (NMR)  to improve: Balance, Coordination, Kinesthetic, Sense, Proprioception, and Posture.  10 minutes of Therapeutic Activities (TA) to improve functional performance.        Patient Education and Home Exercises     Home Exercises Provided and Patient Education Provided     Education provided:   - reviewed for understanding.    Written Home Exercises Provided: Patient instructed to cont prior HEP. Exercises were reviewed and Beth was able to demonstrate them prior to the end of the session.  Beth demonstrated good  understanding of the education provided. See EMR under Patient Instructions for exercises provided during therapy sessions    ASSESSMENT    Increased time on SLR - Abd Iso Hold and laps of Lateral Squat Walk during NMR to improve lateral hip motor control. Increased box height on Step Ups during TA to improve pt's ability to negotiate stairs.         Beth Is progressing well towards her goals.   Pt prognosis is Excellent.     Pt will continue to benefit from skilled outpatient physical therapy to address the deficits listed in the problem list box on initial evaluation, provide pt/family education and to maximize pt's level of independence in the home and community environment.     Pt's spiritual, cultural and educational needs considered and pt agreeable to plan of care and goals.     Anticipated barriers to physical therapy: none    GOALS:    Short Term Goals:  6 weeks Progress      Patient will report decreased pain to <6/10 at worst on VAS to progress toward Prior Level of Function. Progressing   Patient will report improved function by score of >80 out of 100 on FOTO.    Patient will improve AROM to 50% of stated goals in order to progress towards Prior Level of Function.    Patient will improve strength to 50% of stated goals in order to progress towards Prior Level of Function.      Long Term Goals:  12 weeks Progress     Patient will report decreased pain to <3/10 at worst on  VAS to progress toward Prior Level of Function.      Patient will report improved function by a score of >90 out of 100 on FOTO.    Patient will improve ROM and Functional Mobility to stated goals to return to Prior Level of Function.    Patient will improve strength to stated goals in order to return to Prior Level of Function.        PLAN   Continue Plan of Care (POC) and progress per patient tolerance. See treatment section for details on planned progressions next session.      Felix Darby, PT, DPT, SCS

## 2025-02-11 DIAGNOSIS — M25.531 RIGHT WRIST PAIN: Primary | ICD-10-CM

## 2025-02-12 ENCOUNTER — OFFICE VISIT (OUTPATIENT)
Dept: ORTHOPEDICS | Facility: CLINIC | Age: 35
End: 2025-02-12
Payer: COMMERCIAL

## 2025-02-12 ENCOUNTER — HOSPITAL ENCOUNTER (OUTPATIENT)
Dept: RADIOLOGY | Facility: HOSPITAL | Age: 35
Discharge: HOME | End: 2025-02-12
Attending: ORTHOPAEDIC SURGERY
Payer: COMMERCIAL

## 2025-02-12 VITALS — WEIGHT: 199.06 LBS | BODY MASS INDEX: 31.24 KG/M2 | HEIGHT: 67 IN

## 2025-02-12 DIAGNOSIS — S52.551A OTHER CLOSED EXTRA-ARTICULAR FRACTURE OF DISTAL END OF RIGHT RADIUS, INITIAL ENCOUNTER: Primary | ICD-10-CM

## 2025-02-12 DIAGNOSIS — M25.531 RIGHT WRIST PAIN: ICD-10-CM

## 2025-02-12 PROCEDURE — 1159F MED LIST DOCD IN RCRD: CPT | Mod: CPTII,S$GLB,, | Performed by: ORTHOPAEDIC SURGERY

## 2025-02-12 PROCEDURE — 99999 PR PBB SHADOW E&M-EST. PATIENT-LVL III: CPT | Mod: PBBFAC,,, | Performed by: ORTHOPAEDIC SURGERY

## 2025-02-12 PROCEDURE — 99203 OFFICE O/P NEW LOW 30 MIN: CPT | Mod: S$GLB,,, | Performed by: ORTHOPAEDIC SURGERY

## 2025-02-12 PROCEDURE — 73110 X-RAY EXAM OF WRIST: CPT | Mod: TC,RT

## 2025-02-12 PROCEDURE — 1160F RVW MEDS BY RX/DR IN RCRD: CPT | Mod: CPTII,S$GLB,, | Performed by: ORTHOPAEDIC SURGERY

## 2025-02-12 PROCEDURE — 3008F BODY MASS INDEX DOCD: CPT | Mod: CPTII,S$GLB,, | Performed by: ORTHOPAEDIC SURGERY

## 2025-02-12 PROCEDURE — 73110 X-RAY EXAM OF WRIST: CPT | Mod: 26,RT,, | Performed by: STUDENT IN AN ORGANIZED HEALTH CARE EDUCATION/TRAINING PROGRAM

## 2025-02-12 NOTE — PROGRESS NOTES
Subjective:     Patient ID: Beth Hernandez is a 35 y.o. female.    Chief Complaint: Pain of the Right Wrist      HPI:  The patient is a 35-year-old female with a history of a fall 12/23/2024.  She has generalized ligamentous laxity.  She does not know if she has Cliff-Danlos syndrome.  She had a knee dislocation and apparently has had multiple knee dislocations.  She fell and injured her right wrist.  [facility]  She apparently had a right nondisplaced distal radius extra-articular fracture that was missed initially and subsequently treated by Dr. Pearl.  She was treated closed.  She did not have a reduction.  Currently she is in a thumb spica splint.  She wishes a 2nd opinion.  She is unhappy that they missed the initial fracture.    Past Medical History:   Diagnosis Date    Allergy     Anemia 3/13/2012 9:21:08 AM    [facility] Historical - Hematologic: Anemia-No Additional Notes    GERD (gastroesophageal reflux disease)     Headache 3/13/2012 9:18:30 AM    [facility] Historical - [facility]: Headache-No Additional Notes    Iron deficiency anemia     Sleep apnea     Vitamin D deficiency 7/5/2016 8:50:28 AM    [facility] Historical - Quick Add: Vitamin D deficiency-No Additional Notes     Past Surgical History:   Procedure Laterality Date    COLONOSCOPY N/A 9/17/2015    Procedure: COLONOSCOPY;  Surgeon: Fred Baez MD;  Location: [facility];  Service: Endoscopy;  Laterality: N/A;    ESOPHAGOGASTRODUODENOSCOPY  2015    ESOPHAGOGASTRODUODENOSCOPY N/A 1/6/2023    Procedure: EGD (ESOPHAGOGASTRODUODENOSCOPY);  Surgeon: Shell Muñoz MD;  Location: [facility];  Service: Endoscopy;  Laterality: N/A;    PH MONITORING, ESOPHAGUS, WIRELESS, (OFF REFLUX MEDS) N/A 1/6/2023    Procedure: PH MONITORING, ESOPHAGUS, WIRELESS, (OFF REFLUX MEDS);  Surgeon: Shell Muñoz MD;  Location: [facility];  Service: Endoscopy;  Laterality: N/A;    WISDOM TOOTH EXTRACTION       Family History    Problem Relation Name Age of Onset    Cancer Neg Hx       Social History     Socioeconomic History    Marital status: Single   Tobacco Use    Smoking status: Never    Smokeless tobacco: Never   Substance and Sexual Activity    Alcohol use: Yes     Alcohol/week: 0.0 standard drinks of alcohol     Comment: Socially    Drug use: No     Social Drivers of Health     Financial Resource Strain: Patient Unable To Answer (9/4/2024)    Received from [facility]    Overall Financial Resource Strain (CARDIA)     Difficulty of Paying Living Expenses: Patient unable to answer   Food Insecurity: Patient Declined (9/4/2024)    Received from [facility]    Hunger Vital Sign     Worried About Running Out of Food in the Last Year: Patient declined     Ran Out of Food in the Last Year: Patient declined   Transportation Needs: No Transportation Needs (9/4/2024)    Received from [facility]    PRAPARE - Transportation     Lack of Transportation (Medical): No     Lack of Transportation (Non-Medical): No   Physical Activity: Insufficiently Active (9/4/2024)    Received from [facility]    Exercise Vital Sign     Days of Exercise per Week: 3 days     Minutes of Exercise per Session: 30 min   Stress: Patient Declined (9/4/2024)    Received from [facility]    [facility] - Occupational Stress Questionnaire     Feeling of Stress : Patient declined   Housing Stability: Unknown (9/4/2024)    Received from [facility]    Housing Stability Vital Sign     Unable to Pay for Housing in the Last Year: No     Homeless in the Last Year: No     Medication List with Changes/Refills   Current Medications    AMMONIUM LACTATE (LAC-HYDRIN) 12 % LOTION    Apply to damp skin after bathing    ATOMOXETINE (STRATTERA) 40 MG CAPSULE    Take by mouth.    AZELAIC ACID (FINACEA) 15 % FOAM    AAA of face bid    BUSPIRONE (BUSPAR) 10 MG TABLET    Take 20 mg by mouth once daily.    CLINDAMYCIN (CLEOCIN T) 1 %  SWAB    Apply topically 2 (two) times daily.    DEXBROMPHENIRAMINE-PHENYLEPH (ALA-HIST PE) 2-10 MG TAB    1 tablet as needed Orally every 6 hrs for 7 days    DOXEPIN (SINEQUAN) 10 MG CAPSULE    Take 10-20 mg by mouth every evening.    ESCITALOPRAM OXALATE (LEXAPRO) 20 MG TABLET    Take 20 mg by mouth once daily.    FAMOTIDINE (PEPCID) 40 MG TABLET    1-2 times per day.    LISDEXAMFETAMINE (VYVANSE) 20 MG CAPSULE    Take 20 mg by mouth every morning.    OMEPRAZOLE (PRILOSEC) 20 MG CAPSULE    Take 20 mg by mouth once daily.    TRIAMCINOLONE ACETONIDE 0.1% (KENALOG) 0.1 % CREAM    AAA bid prn eczema. Do not use on face.   Discontinued Medications    PANTOPRAZOLE (PROTONIX) 40 MG TABLET    Take 1 tablet (40 mg total) by mouth once daily.    PAXLOVID 300 MG (150 MG X 2)-100 MG COPACKAGED TABLETS (EUA)    Take by mouth.     Review of patient's allergies indicates:  No Known Allergies  Review of Systems   Constitutional: Negative for malaise/fatigue.   HENT:  Negative for hearing loss.    Eyes:  Negative for double vision and visual disturbance.   Cardiovascular:  Negative for chest pain.   Respiratory:  Negative for shortness of breath.    Endocrine: Negative for cold intolerance.   Hematologic/Lymphatic: Does not bruise/bleed easily.   Skin:  Negative for poor wound healing and suspicious lesions.   Musculoskeletal:  Positive for joint pain. Negative for gout and joint swelling.   Gastrointestinal:  Positive for heartburn. Negative for nausea and vomiting.   Genitourinary:  Negative for dysuria.   Neurological:  Negative for numbness, paresthesias and sensory change.   Psychiatric/Behavioral:  Negative for depression, memory loss and substance abuse. The patient is not nervous/anxious.    Allergic/Immunologic: Negative for persistent infections.       Objective:   Body mass index is 31.18 kg/m².  There were no vitals filed for this visit.             General    Constitutional: She is oriented to person, place, and time.  She appears well-developed and well-nourished. No distress.   HENT:   Head: Normocephalic.   Eyes: EOM are normal.   Pulmonary/Chest: Effort normal.   Neurological: She is oriented to person, place, and time.   Psychiatric: She has a normal mood and affect.             Right Hand/Wrist Exam     Inspection   Scars: Wrist - absent Hand -  absent  Effusion: Wrist - absent Hand -  absent    Pain   Wrist - The patient exhibits pain of the CMC.    Other     Neuorologic Exam    Median Distribution: normal  Ulnar Distribution: normal  Radial Distribution: normal    Comments:  The patient has no tenderness about the fracture site right distal radius.  She does have stiffness of the right wrist compared to the left.  She has been in a splint for the last 6 weeks since the fracture.  She does have basal joint pain right thumb but no instability          Vascular Exam       Capillary Refill  Right Hand: normal capillary refill          Relevant imaging results reviewed and interpreted by me, discussed with the patient and / or family today radiographs right wrist showed a transverse healed fracture extra-articular right distal radius.  The scaphoid appears benign.  There is no arthritis of the basal joint of the thumb  Assessment:     Encounter Diagnosis   Name Primary?    Other closed extra-articular fracture of distal end of right radius, initial encounter Yes        Plan:       The patient was told she should be able to discontinue her splint and work on motion.  She declines referral to physical therapy.  I told her that it may take 6 months for the swelling to go down tenderness go away and she was instructed in range-of-motion exercises.  I believe her prognosis is favorable and she will return on a as needed basis.              Disclaimer: This note was prepared using a voice recognition system and is likely to have sound alike errors within the text.